# Patient Record
Sex: FEMALE | Race: WHITE | NOT HISPANIC OR LATINO | Employment: UNEMPLOYED | URBAN - METROPOLITAN AREA
[De-identification: names, ages, dates, MRNs, and addresses within clinical notes are randomized per-mention and may not be internally consistent; named-entity substitution may affect disease eponyms.]

---

## 2017-04-10 DIAGNOSIS — I10 ESSENTIAL HYPERTENSION: ICD-10-CM

## 2017-04-11 RX ORDER — AMLODIPINE BESYLATE 5 MG/1
5 TABLET ORAL DAILY
Qty: 90 TABLET | Refills: 4 | OUTPATIENT
Start: 2017-04-11

## 2017-04-12 DIAGNOSIS — I10 ESSENTIAL HYPERTENSION: ICD-10-CM

## 2017-04-12 RX ORDER — AMLODIPINE BESYLATE 5 MG/1
5 TABLET ORAL DAILY
Qty: 30 TABLET | Refills: 0 | OUTPATIENT
Start: 2017-04-12

## 2017-04-12 NOTE — TELEPHONE ENCOUNTER
Unable to contact patient, letter mailed to home address that she will need to establish care with a new provider. Her last annual was last February.

## 2017-04-12 NOTE — TELEPHONE ENCOUNTER
----- Message from Corey Spivey sent at 4/12/2017  1:59 PM CDT -----  Contact: Self  REFILL: amlodipine (NORVASC) 5 MG tablet

## 2017-04-12 NOTE — LETTER
April 12, 2017    Livier Quintana  [address]  [place] [address]             [facility]  [address]  [place] [address]  Phone: [phone]  Fax: [phone] Dear Mrs. Quintana:      As of 12/31/2016 Dr. Shane retired. We received your refill  request but you will need to choose a new provider and schedule  an office visit to establish care. Please contact the clinic for any  assistance you may need to schedule this appointment.       If you have any questions or concerns, please don't hesitate to call.    Sincerely,        Deisi Gomez LPN

## 2017-04-13 DIAGNOSIS — I10 ESSENTIAL HYPERTENSION: ICD-10-CM

## 2017-04-13 PROBLEM — F41.9 ANXIETY: Chronic | Status: ACTIVE | Noted: 2017-04-13

## 2017-04-13 RX ORDER — AMLODIPINE BESYLATE 5 MG/1
5 TABLET ORAL DAILY
Qty: 90 TABLET | Refills: 4 | Status: SHIPPED | OUTPATIENT
Start: 2017-04-13 | End: 2017-04-17 | Stop reason: ALTCHOICE

## 2017-04-13 NOTE — TELEPHONE ENCOUNTER
----- Message from Corey Spivey sent at 4/13/2017 12:40 PM CDT -----  Contact: Self  Pt wants to know if she can get a few amlodipine (NORVASC) 5 MG tablet pills until her OV on 4/17 with . Pt can be reached @ [phone].

## 2017-04-17 ENCOUNTER — OFFICE VISIT (OUTPATIENT)
Dept: FAMILY MEDICINE | Facility: CLINIC | Age: 57
End: 2017-04-17
Payer: COMMERCIAL

## 2017-04-17 VITALS
SYSTOLIC BLOOD PRESSURE: 120 MMHG | HEIGHT: 62 IN | WEIGHT: 137.25 LBS | DIASTOLIC BLOOD PRESSURE: 82 MMHG | OXYGEN SATURATION: 97 % | BODY MASS INDEX: 25.26 KG/M2 | HEART RATE: 75 BPM | TEMPERATURE: 98 F

## 2017-04-17 DIAGNOSIS — R51.9 HEADACHE, UNSPECIFIED HEADACHE TYPE: ICD-10-CM

## 2017-04-17 DIAGNOSIS — F41.9 ANXIETY: Chronic | ICD-10-CM

## 2017-04-17 DIAGNOSIS — M54.2 NECK PAIN ON LEFT SIDE: ICD-10-CM

## 2017-04-17 DIAGNOSIS — F51.01 PRIMARY INSOMNIA: ICD-10-CM

## 2017-04-17 DIAGNOSIS — I10 ESSENTIAL HYPERTENSION: Primary | Chronic | ICD-10-CM

## 2017-04-17 DIAGNOSIS — M25.512 LEFT SHOULDER PAIN, UNSPECIFIED CHRONICITY: ICD-10-CM

## 2017-04-17 DIAGNOSIS — Z11.59 NEED FOR HEPATITIS C SCREENING TEST: ICD-10-CM

## 2017-04-17 DIAGNOSIS — M79.602 ARM PAIN, DIFFUSE, LEFT: ICD-10-CM

## 2017-04-17 DIAGNOSIS — L98.9 LEG SKIN LESION, LEFT: ICD-10-CM

## 2017-04-17 DIAGNOSIS — Z23 NEED FOR DIPHTHERIA-TETANUS-PERTUSSIS (TDAP) VACCINE, ADULT/ADOLESCENT: ICD-10-CM

## 2017-04-17 DIAGNOSIS — R00.2 PALPITATIONS: ICD-10-CM

## 2017-04-17 DIAGNOSIS — Z78.0 POST-MENOPAUSAL: ICD-10-CM

## 2017-04-17 PROCEDURE — 3074F SYST BP LT 130 MM HG: CPT | Mod: S$GLB,,, | Performed by: INTERNAL MEDICINE

## 2017-04-17 PROCEDURE — 93005 ELECTROCARDIOGRAM TRACING: CPT | Mod: S$GLB,,, | Performed by: INTERNAL MEDICINE

## 2017-04-17 PROCEDURE — 99215 OFFICE O/P EST HI 40 MIN: CPT | Mod: 25,S$GLB,, | Performed by: INTERNAL MEDICINE

## 2017-04-17 PROCEDURE — 90715 TDAP VACCINE 7 YRS/> IM: CPT | Mod: S$GLB,,, | Performed by: INTERNAL MEDICINE

## 2017-04-17 PROCEDURE — 1160F RVW MEDS BY RX/DR IN RCRD: CPT | Mod: S$GLB,,, | Performed by: INTERNAL MEDICINE

## 2017-04-17 PROCEDURE — 3079F DIAST BP 80-89 MM HG: CPT | Mod: S$GLB,,, | Performed by: INTERNAL MEDICINE

## 2017-04-17 PROCEDURE — 93010 ELECTROCARDIOGRAM REPORT: CPT | Mod: S$GLB,,, | Performed by: INTERNAL MEDICINE

## 2017-04-17 PROCEDURE — 90471 IMMUNIZATION ADMIN: CPT | Mod: S$GLB,,, | Performed by: INTERNAL MEDICINE

## 2017-04-17 PROCEDURE — 99999 PR PBB SHADOW E&M-EST. PATIENT-LVL III: CPT | Mod: PBBFAC,,, | Performed by: INTERNAL MEDICINE

## 2017-04-17 RX ORDER — ALPRAZOLAM 0.25 MG/1
0.25 TABLET ORAL 2 TIMES DAILY PRN
Qty: 60 TABLET | Refills: 0 | Status: SHIPPED | OUTPATIENT
Start: 2017-04-17 | End: 2017-10-18

## 2017-04-17 RX ORDER — NAPROXEN 500 MG/1
500 TABLET ORAL 2 TIMES DAILY WITH MEALS
Qty: 30 TABLET | Refills: 3 | Status: SHIPPED | OUTPATIENT
Start: 2017-04-17 | End: 2017-10-18

## 2017-04-17 NOTE — PROGRESS NOTES
Assessment & Plan  Essential hypertension - BP actually pretty normal off meds.  For now stay off the amlodipine and monitor; restart for BP > 140/90 consistently.   Possible that BP too low and responsive tachycardia as a result.  -     Lipid panel; Future; Expected date: 4/17/17  -     Comprehensive metabolic panel; Future; Expected date: 4/17/17  -     TSH; Future; Expected date: 4/17/17    Anxiety - refilled xanax to take PRN.  -     alprazolam (XANAX) 0.25 MG tablet; Take 1 tablet (0.25 mg total) by mouth 2 (two) times daily as needed.  Dispense: 60 tablet; Refill: 0    Palpitations - EKG WNL; she'll contact me if this persists off BP meds and if so - referral to cardiology.  -     EKG 12-lead    Post-menopausal - check Vit D.  Not taking supplement.  -     Vitamin D; Future; Expected date: 4/17/17    Leg skin lesion, left - and on the chest - nonhealing on the L leg; the chest one looks benign.  Referral to derm for eval.  -     Ambulatory referral to Dermatology    Neck pain on left side  Left shoulder pain, unspecified chronicity  Headache - refill naproxen for PRN use.  -     naproxen (NAPROSYN) 500 MG tablet; Take 1 tablet (500 mg total) by mouth 2 (two) times daily with meals.  Dispense: 30 tablet; Refill: 3    Need for hepatitis C screening test  -     Hepatitis C antibody; Future; Expected date: 4/17/17    Need for diphtheria-tetanus-pertussis (Tdap) vaccine, adult/adolescent  -     Tdap Vaccine    Primary insomnia - recommended OTC melatonin to start.        Medications Discontinued During This Encounter   Medication Reason    alprazolam (XANAX) 0.25 MG tablet Reorder    naproxen (NAPROSYN) 500 MG tablet Reorder       Follow-up: No Follow-up on file.      =================================================================      Chief Complaint   Patient presents with    est pcp    Medication Refill       STAR Maier is a 57 y.o. female, last appointment with this clinic was Visit date not  found.    Patient's last menstrual period was 09/01/2012.    Mother is Ina Rosado    Former patient of Dr. Shane.  Hypertension, amlodipine x years.  By descripption, sounds like hx of diuretic and ACEI with SE of cough.  Checks BP and HR daily.   Out of amlodipine x 1 week.  However, BP today is pretty good still normally 110/70s.  Has been taking x years.  Notes however, Home HR - in the 80s.  Feels like it races.  Almost like it flutters.  Makes her feel dyspneic.  Occurs at rest like watching TV.  Daily occurrence.    She walks a lot for physical activity but not dedicated exercise. No chest pain/dyspnea/presyncope with physical activity.    Anxiety with a history of alprazolam, requesting RF; #60 lasts a year.  Takes only during extreme episodes of anxiety.  Family history breast cancer.  Followed by gynecology.  3/9/2015 colonoscopy normal repeat 10 years  Notes some issues with insomnia. Would like something mild for this.  Is concerned about taking anything strong for this ie the alprazolam.  Never OTC melatonin.  Benadryl caused opposite effect in her mother so would like to avoid that.    GERD - Takes prilosec PRN.  Takes Tums maybe once a week.  Notes some early satiety.  No pain swallowing.  No food getting stuck sensation.  Had a colonoscopy done about 2 years ago.  Weight stable x years.    Has heard about vitamin D supplement and wonders if she needs it.  Not taking HRT.  Post menopausal.  Is interested in seeing dermatology.  Has hx of mole removal on chest, arm, and has a spot on the L lateral knee, palpable.    Requesting labs. nonfasting today.    Out of amlodipine x 1 week.  .  By history sounds like hx of ACEI      Patient Care Team:  Juan Jose Guallpa MD as PCP - General (Internal Medicine)    Patient Active Problem List    Diagnosis Date Noted    Anxiety 04/13/2017    Family history of breast cancer in sister 02/23/2016    Essential hypertension 10/05/2015    Screening for colon  cancer 2015     3/9/2015 colonoscopy normal repeat 10 years      Nuclear sclerosis - Both Eyes 2012       PAST MEDICAL HISTORY:  Past Medical History:   Diagnosis Date    Amblyopia     Family history of breast cancer     SISTER WITH RECURRENCE    Fissure, anal     inner     Hypertension     Nuclear sclerosis - Both Eyes 2012    Strabismus        PAST SURGICAL HISTORY:  Past Surgical History:   Procedure Laterality Date     SECTION, CLASSIC      x3    ENDOMETRIAL ABLATION      KJB---D&C HSCOPE/HTA    STRABISMUS SURGERY      X 2     Family History   Problem Relation Age of Onset    Amblyopia Mother     Rheum arthritis Mother     Heart disease Father     Hypertension Father     Pulmonary fibrosis Father     Arrhythmia Father     Amblyopia Sister     Glaucoma Sister      narrow angle     Breast cancer Sister     Diabetes Maternal Grandmother     Ovarian cancer Neg Hx     Colon cancer Neg Hx        SOCIAL HISTORY:  Social History     Social History    Marital status:      Spouse name: N/A    Number of children: N/A    Years of education: N/A     Occupational History    Not on file.     Social History Main Topics    Smoking status: Never Smoker    Smokeless tobacco: Not on file    Alcohol use No      Comment: socially    Drug use: No    Sexual activity: Yes     Other Topics Concern    Not on file     Social History Narrative       ALLERGIES AND MEDICATIONS: updated and reviewed.  Review of patient's allergies indicates:  No Known Allergies  Current Outpatient Prescriptions   Medication Sig Dispense Refill    alprazolam (XANAX) 0.25 MG tablet Take 1 tablet (0.25 mg total) by mouth 2 (two) times daily as needed. 60 tablet 0    amlodipine (NORVASC) 5 MG tablet Take 1 tablet (5 mg total) by mouth once daily. 90 tablet 4    naproxen (NAPROSYN) 500 MG tablet Take 1 tablet (500 mg total) by mouth 2 (two) times daily with meals. 30 tablet 1     No current  "facility-administered medications for this visit.        Review of Systems   Constitutional: Negative for chills, fever, malaise/fatigue and weight loss.   HENT: Negative for congestion.    Eyes: Negative for blurred vision and pain.   Respiratory: Negative for cough, sputum production, shortness of breath and wheezing.    Cardiovascular: Positive for palpitations. Negative for chest pain, claudication, leg swelling and PND.   Gastrointestinal: Negative for abdominal pain, blood in stool, constipation, diarrhea and heartburn.   Genitourinary: Negative for dysuria, hematuria and urgency.   Musculoskeletal: Negative for joint pain.   Skin: Positive for rash.   Neurological: Negative for tingling, focal weakness, weakness and headaches.   Psychiatric/Behavioral: Negative for depression. The patient has insomnia. The patient is not nervous/anxious.        Physical Exam   Vitals:    04/17/17 1105 04/17/17 1217   BP: 126/84 120/82   BP Location: Right arm Right arm   Patient Position: Sitting Sitting   BP Method: Manual    Pulse: 75    Temp: 97.8 °F (36.6 °C)    TempSrc: Oral    SpO2: 97%    Weight: 62.2 kg (137 lb 3.8 oz)    Height: 5' 2" (1.575 m)     Body mass index is 25.1 kg/(m^2).  Weight: 62.2 kg (137 lb 3.8 oz)   Height: 5' 2" (157.5 cm)     Physical Exam   Constitutional: She is oriented to person, place, and time. She appears well-developed and well-nourished. No distress.   Eyes: EOM are normal.   Cardiovascular: Normal rate, regular rhythm and normal heart sounds.    No murmur heard.  Pulmonary/Chest: Effort normal and breath sounds normal.   Musculoskeletal: Normal range of motion.   Neurological: She is alert and oriented to person, place, and time. Coordination normal.   Skin: Skin is warm and dry.   Left lateral knee - small papule looks almost like a scab but pt notes present x > 1 year, < 1mm diameter, no induration, no surr erythema, no ulceration.  Left upper chest - well defined large papule, skin " colored, somewhat velvety, about 7 mm diameter   Psychiatric: She has a normal mood and affect. Her behavior is normal. Thought content normal.     EKG - tracing personally reviewed - NSR, normal intervals.  Nonspecific T wave abn in the frontal leads V1-V3 otherwise unremarkable.

## 2017-04-17 NOTE — MR AVS SNAPSHOT
[facility]  [address]  [place] [address]  Phone: [phone]  Fax: [phone]                  Livier Quintana   2017 11:00 AM   Office Visit    Description:  Female : 1960   Provider:  Juan Jose Guallpa MD   Department:  [facility]           Reason for Visit     est pcp     Medication Refill           Diagnoses this Visit        Comments    Essential hypertension    -  Primary     Anxiety         Headache, unspecified headache type         Palpitations         Post-menopausal         Leg skin lesion, left         Neck pain on left side         Left shoulder pain, unspecified chronicity         Arm pain, diffuse, left         Need for hepatitis C screening test         Need for diphtheria-tetanus-pertussis (Tdap) vaccine, adult/adolescent         Primary insomnia                To Do List           Goals (5 Years of Data)     None       These Medications        Disp Refills Start End    alprazolam (XANAX) 0.25 MG tablet 60 tablet 0 2017     Take 1 tablet (0.25 mg total) by mouth 2 (two) times daily as needed. - Oral    Pharmacy: [facility] - [place] [address] Ph #: [phone]       naproxen (NAPROSYN) 500 MG tablet 30 tablet 3 2017     Take 1 tablet (500 mg total) by mouth 2 (two) times daily with meals. - Oral    Pharmacy: [facility] - [place] Ph #: [phone]         [facility] On [facility]     [facility] On Call Nurse Care Line -  Assistance  Unless otherwise directed by your provider, please contact [facility], our nurse care line that is available for  assistance.     Registered nurses in the [facility] Center provide: appointment scheduling, clinical advisement, health education, and other advisory services.  Call: [phone] (toll free)               Medications           Message regarding Medications     Verify the changes and/or  "additions to your medication regime listed below are the same as discussed with your clinician today.  If any of these changes or additions are incorrect, please notify your healthcare provider.             Verify that the below list of medications is an accurate representation of the medications you are currently taking.  If none reported, the list may be blank. If incorrect, please contact your healthcare provider. Carry this list with you in case of emergency.           Current Medications     alprazolam (XANAX) 0.25 MG tablet Take 1 tablet (0.25 mg total) by mouth 2 (two) times daily as needed.    amlodipine (NORVASC) 5 MG tablet Take 1 tablet (5 mg total) by mouth once daily.    naproxen (NAPROSYN) 500 MG tablet Take 1 tablet (500 mg total) by mouth 2 (two) times daily with meals.           Clinical Reference Information           Your Vitals Were     BP Pulse Temp Height Weight Last Period    126/84 (BP Location: Right arm, Patient Position: Sitting, BP Method: Manual) 75 97.8 °F (36.6 °C) (Oral) 5' 2" (1.575 m) 62.2 kg (137 lb 3.8 oz) 09/01/2012    SpO2 BMI             97% 25.1 kg/m2         Blood Pressure          Most Recent Value    BP  126/84      Allergies as of 4/17/2017     No Known Allergies      Immunizations Administered on Date of Encounter - 4/17/2017     Name Date Dose VIS Date Route    TDAP  Incomplete 0.5 mL 2/24/2015 Intramuscular      Orders Placed During Today's Visit      Normal Orders This Visit    Ambulatory referral to Dermatology     EKG 12-lead     Tdap Vaccine     Future Labs/Procedures Expected by Expires    Comprehensive metabolic panel  4/17/2017 4/17/2018    Hepatitis C antibody  4/17/2017 6/16/2018    Lipid panel  4/17/2017 4/17/2018    TSH  4/17/2017 4/17/2018    Vitamin D  4/17/2017 4/17/2018      [facility] Sign-Up     Activating your [facility] account is as easy as 1-2-3!     1) Visit [email or web address], select Sign Up Now, enter this activation code and your date of birth, then " select Next.  [email or web address]  Expires: 6/1/2017 11:08 AM      2) Create a username and password to use when you visit [facility] in the future and select a security question in case you lose your password and select Next.    3) Enter your e-mail address and click Sign Up!    Additional Information  If you have questions, please e-mail [email or web address] or call [phone] to talk to our [facility] staff. Remember, [facility] is NOT to be used for urgent needs. For medical emergencies, dial 911.         Instructions    FOR NOW STAY OFF THE AMLODIPINE.  CHECK BP - IF IT GOES ABOVE 140/90 AND STAYS HIGH - CALL - RESTART AMLODIPINE.       Language Assistance Services     ATTENTION: Language assistance services are available, free of charge. Please call [phone].      ATENCIÓN: Si habla español, tiene a hernández disposición servicios gratuitos de asistencia lingüística. Llame al [phone].     JASMIN Ý: N?u b?n nói Ti?ng Vi?t, có các d?ch v? h? tr? ngôn ng? mi?n phí dành cho b?n. G?i s? [phone].         [facility] [place] complies with applicable Federal civil rights laws and does not discriminate on the basis of race, color, national origin, age, disability, or sex.

## 2017-04-18 ENCOUNTER — LAB VISIT (OUTPATIENT)
Dept: LAB | Facility: HOSPITAL | Age: 57
End: 2017-04-18
Attending: INTERNAL MEDICINE
Payer: COMMERCIAL

## 2017-04-18 DIAGNOSIS — Z78.0 POST-MENOPAUSAL: ICD-10-CM

## 2017-04-18 DIAGNOSIS — I10 ESSENTIAL HYPERTENSION: Chronic | ICD-10-CM

## 2017-04-18 DIAGNOSIS — Z11.59 NEED FOR HEPATITIS C SCREENING TEST: ICD-10-CM

## 2017-04-18 LAB
25(OH)D3+25(OH)D2 SERPL-MCNC: 26 NG/ML
ALBUMIN SERPL BCP-MCNC: 3.9 G/DL
ALP SERPL-CCNC: 112 U/L
ALT SERPL W/O P-5'-P-CCNC: 11 U/L
ANION GAP SERPL CALC-SCNC: 7 MMOL/L
AST SERPL-CCNC: 17 U/L
BILIRUB SERPL-MCNC: 0.5 MG/DL
BUN SERPL-MCNC: 18 MG/DL
CALCIUM SERPL-MCNC: 9 MG/DL
CHLORIDE SERPL-SCNC: 109 MMOL/L
CHOLEST/HDLC SERPL: 3.9 {RATIO}
CO2 SERPL-SCNC: 25 MMOL/L
CREAT SERPL-MCNC: 0.8 MG/DL
EST. GFR  (AFRICAN AMERICAN): >60 ML/MIN/1.73 M^2
EST. GFR  (NON AFRICAN AMERICAN): >60 ML/MIN/1.73 M^2
GLUCOSE SERPL-MCNC: 93 MG/DL
HCV AB SERPL QL IA: NEGATIVE
HDL/CHOLESTEROL RATIO: 25.7 %
HDLC SERPL-MCNC: 218 MG/DL
HDLC SERPL-MCNC: 56 MG/DL
LDLC SERPL CALC-MCNC: 132.4 MG/DL
NONHDLC SERPL-MCNC: 162 MG/DL
POTASSIUM SERPL-SCNC: 4 MMOL/L
PROT SERPL-MCNC: 7.3 G/DL
SODIUM SERPL-SCNC: 141 MMOL/L
TRIGL SERPL-MCNC: 148 MG/DL
TSH SERPL DL<=0.005 MIU/L-ACNC: 1.56 UIU/ML

## 2017-04-18 PROCEDURE — 84443 ASSAY THYROID STIM HORMONE: CPT

## 2017-04-18 PROCEDURE — 80053 COMPREHEN METABOLIC PANEL: CPT

## 2017-04-18 PROCEDURE — 80061 LIPID PANEL: CPT

## 2017-04-18 PROCEDURE — 86803 HEPATITIS C AB TEST: CPT

## 2017-04-18 PROCEDURE — 36415 COLL VENOUS BLD VENIPUNCTURE: CPT | Mod: PO

## 2017-04-18 PROCEDURE — 82306 VITAMIN D 25 HYDROXY: CPT

## 2017-04-20 ENCOUNTER — TELEPHONE (OUTPATIENT)
Dept: FAMILY MEDICINE | Facility: CLINIC | Age: 57
End: 2017-04-20

## 2017-04-20 NOTE — PROGRESS NOTES
Vit D low. Post menopausal not on supplement.  HCV screening negative.  Lipid OK, 10 year risk < 7.5%.  Not calling her hypertensive, stopped BP meds.  CMP WNL  Results to pt.  Start Ca/vit D supplement 1200/800 mg daily.

## 2017-04-20 NOTE — TELEPHONE ENCOUNTER
Patient has an appointment 6/22/17 10am with Dr. Clemens at the [facility], patient was notified.

## 2017-06-22 ENCOUNTER — INITIAL CONSULT (OUTPATIENT)
Dept: DERMATOLOGY | Facility: CLINIC | Age: 57
End: 2017-06-22
Payer: COMMERCIAL

## 2017-06-22 DIAGNOSIS — L73.8 SEBACEOUS HYPERPLASIA OF FACE: ICD-10-CM

## 2017-06-22 DIAGNOSIS — L81.4 SOLAR LENTIGO: ICD-10-CM

## 2017-06-22 DIAGNOSIS — D22.9 MULTIPLE BENIGN NEVI: Primary | ICD-10-CM

## 2017-06-22 DIAGNOSIS — L72.0 MILIUM CYST: ICD-10-CM

## 2017-06-22 DIAGNOSIS — L71.8 ACNE ROSACEA, ERYTHEMATOUS TELANGIECTATIC TYPE: ICD-10-CM

## 2017-06-22 DIAGNOSIS — L81.8 IDIOPATHIC GUTTATE HYPOMELANOSIS: ICD-10-CM

## 2017-06-22 DIAGNOSIS — D18.01 ANGIOMA OF SKIN: ICD-10-CM

## 2017-06-22 DIAGNOSIS — L82.1 SK (SEBORRHEIC KERATOSIS): ICD-10-CM

## 2017-06-22 PROCEDURE — 99999 PR PBB SHADOW E&M-EST. PATIENT-LVL II: CPT | Mod: PBBFAC,,, | Performed by: PATHOLOGY

## 2017-06-22 PROCEDURE — 99203 OFFICE O/P NEW LOW 30 MIN: CPT | Mod: S$GLB,,, | Performed by: PATHOLOGY

## 2017-06-22 RX ORDER — AMLODIPINE BESYLATE 2.5 MG/1
2.5 TABLET ORAL DAILY
COMMUNITY
End: 2018-07-20

## 2017-06-22 NOTE — PROGRESS NOTES
Subjective:       Patient ID:  Livier Quintana is a 57 y.o. female who presents for   Chief Complaint   Patient presents with    Skin Check     TBSE     HPI  Pt with no personal h/o skin cancer or dysplastic nevi.  Does get significant sun exposure - recreational fishing.  Concerned about some new raised brown spots to legs and some raised bumps on face - all asymptomatic and not treated in the past.  All present for several months.    Review of Systems   Constitutional: Negative for fever, chills, weight loss, weight gain, fatigue, night sweats and malaise.   Skin: Positive for daily sunscreen use and activity-related sunscreen use. Negative for itching, rash and recent sunburn.   Hematologic/Lymphatic: Bruises/bleeds easily.        Objective:    Physical Exam   Constitutional: She appears well-developed and well-nourished. No distress.   Neurological: She is alert and oriented to person, place, and time. She is not disoriented.   Psychiatric: She has a normal mood and affect.   Skin:   Areas Examined (abnormalities noted in diagram):   Scalp / Hair Palpated and Inspected  Head / Face Inspection Performed  Neck Inspection Performed  Chest / Axilla Inspection Performed  Abdomen Inspection Performed  Genitals / Buttocks / Groin Inspection Performed  Back Inspection Performed  RUE Inspected  LUE Inspection Performed  RLE Inspected  LLE Inspection Performed  Nails and Digits Inspection Performed                   Diagram Legend     Erythematous scaling macule/papule c/w actinic keratosis       Vascular papule c/w angioma      Pigmented verrucoid papule/plaque c/w seborrheic keratosis      Yellow umbilicated papule c/w sebaceous hyperplasia      Irregularly shaped tan macule c/w lentigo     1-2 mm smooth white papules consistent with Milia      Movable subcutaneous cyst with punctum c/w epidermal inclusion cyst      Subcutaneous movable cyst c/w pilar cyst      Firm pink to brown papule c/w dermatofibroma       Pedunculated fleshy papule(s) c/w skin tag(s)      Evenly pigmented macule c/w junctional nevus     Mildly variegated pigmented, slightly irregular-bordered macule c/w mildly atypical nevus      Flesh colored to evenly pigmented papule c/w intradermal nevus       Pink pearly papule/plaque c/w basal cell carcinoma      Erythematous hyperkeratotic cursted plaque c/w SCC      Surgical scar with no sign of skin cancer recurrence      Open and closed comedones      Inflammatory papules and pustules      Verrucoid papule consistent consistent with wart     Erythematous eczematous patches and plaques     Dystrophic onycholytic nail with subungual debris c/w onychomycosis     Umbilicated papule    Erythematous-base heme-crusted tan verrucoid plaque consistent with inflamed seborrheic keratosis     Erythematous Silvery Scaling Plaque c/w Psoriasis     See annotation      Assessment / Plan:        Nevi - Patient with several mildly atypical nevi. Instructed patient to observe lesion(s) for changes and follow up in clinic if changes are noted. Discussed ABCDE's of moles and brochure provided.    Milium - Reassurance given to patient. No treatment is necessary.   Treatment of benign, asymptomatic lesions may be considered cosmetic.    Solar lentigines - Reassurance given to patient. No treatment is necessary.   Treatment of benign, asymptomatic lesions may be considered cosmetic.  Discussed sun protection and importance of broad spectrum UVA/UVB sunblock    Angioma - This is a benign vascular lesion. Reassurance given. No treatment required.     SK - These are benign inherited growths without a malignant potential. Reassurance given to patient. No treatment is necessary.     Sebaceous hyperplasia - Reassurance given to patient. No treatment is necessary.   Treatment of benign, asymptomatic lesions may be considered cosmetic.    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 No treatment is necessary.    Treatment of benign, asymptomatic lesions may be considered cosmetic.    Rosacea - erythematotelangiectatic type - pt elects not to treat at this time             Return in about 1 year (around 6/22/2018), or if symptoms worsen or fail to improve.

## 2017-06-22 NOTE — LETTER
June 22, 2017      Juan Jose Guallpa MD  [address]  [place] [address]           [facility]  [address]  [place] [address]  Phone: [phone]  Fax: [phone]          Patient: Livier Quintana   MR Number: [id number]   YOB: 1960   Date of Visit: 6/22/2017       Dear Dr. Juan Jose Guallpa:    Thank you for referring Livier Quintana to me for evaluation. Attached you will find relevant portions of my assessment and plan of care.    If you have questions, please do not hesitate to call me. I look forward to following Livier Quintana along with you.    Sincerely,    Charisma Clemens MD    Enclosure  CC:  No Recipients    If you would like to receive this communication electronically, please contact [email or web address] or [phone] to request more information on [employer] Link access.    For providers and/or their staff who would like to refer a patient to [facility], please contact us through our one-stop-shop provider referral line, [facility], at [phone].    If you feel you have received this communication in error or would no longer like to receive these types of communications, please e-mail [email or web address]

## 2017-06-22 NOTE — PATIENT INSTRUCTIONS
Summer Sun Protection      The [facility] would like to remind you of the importance of sun protection all year round and particularly during the summer when the suns rays are the strongest. It has been proven that both acute and chronic sun exposure damages our cells and leads to skin cancer. Beyond skin cancer, the sun causes 90% of the symptoms of pre-mature skin aging, including wrinkles, lentigines (brown spots), and thin, easily bruised skin. Proper sun protection can help prevent these unwanted conditions.    Many patients report that the dont go in the sun. It has been shown that the average person receives 18 hours of incidental sun exposure per week during activities such as walking through parking lots, driving, or sitting next to windows. This accumulates to several bad sunburns per year!    In choosing sunscreen, you want one that protects against both UVA and UVB rays. It is recommended that you use one of SPF 30 or higher. It is important to apply the sunscreen about 20 minutes prior to sun exposure. Most sunscreens are chemical sunscreens and a reaction must take place in the skin so that they are effective. If they are applied and then you are immediately exposed to the sun or start sweating, this reaction has not had time to take place and you are therefore unprotected. Sunscreen needs to be reapplied every 2 hours if you are participating in water sports or sweating. We recommend Elta MD or Neutrogena Ultra Sheer Dry Touch SPF 55 for daily use; however there are many options and it is most important for you to find one that you will use on a consistent basis.    If you have sensitive skin, you may do best with a sunscreen that contains only physical blockers such as titanium dioxide or zinc oxide. These are typically thicker and harder to apply, however they afford very good protection. Neutrogena Sensitive Skin, Blue Lizard Sensitive Skin (pink top) or Neutrogena Pure  and Free are popular ones.     Aside from sunscreen, clothes with UV protection, wide brimmed hats, and sunglasses are other means of sun protection that we recommend.                        [facility] - DERMATOLOG[facility]  [address]  [place] [address]  Dept: [phone]  Dept Fax: [phone]                                                                               SEBORRHEIC KERATOSES        What causes seborrheic keratoses?    Seborrheic keratoses are harmless, common skin growths that first appear during adult life.  As time goes by, more growths appear.  Some persons have a very large number of them.  Seborrheic keratoses appear on both covered and uncovered parts of the body; they are not caused by sunlight.  The tendency to develop seborrheic keratoses is inherited.    Seborrheic keratoses are harmless and never become malignant.  They begin as slightly raised, light brown spots.  Gradually they thicken and take on a rough wartlike surface.  They slowly darken and may turn black.  These color changes are harmless.  Seborrheic keratoses are superficial and look as if they were stuck on the skin.  Persons who have had several seborrheic keratoses can usually recognize this type of benign growth.  However, if you are concerned or unsure about any growth, consult me.    Treatment    Seborrheic keratoses can easily be removed in the office.  The only reason for removing a seborrheic keratosis is your wish to get rid of it.

## 2017-06-23 ENCOUNTER — OFFICE VISIT (OUTPATIENT)
Dept: OPHTHALMOLOGY | Facility: CLINIC | Age: 57
End: 2017-06-23
Payer: COMMERCIAL

## 2017-06-23 DIAGNOSIS — I10 ESSENTIAL HYPERTENSION: ICD-10-CM

## 2017-06-23 DIAGNOSIS — H25.13 NUCLEAR SCLEROSIS, BILATERAL: Primary | ICD-10-CM

## 2017-06-23 DIAGNOSIS — H04.123 DRY EYES, BILATERAL: ICD-10-CM

## 2017-06-23 DIAGNOSIS — H52.7 REFRACTIVE ERROR: ICD-10-CM

## 2017-06-23 PROCEDURE — 92014 COMPRE OPH EXAM EST PT 1/>: CPT | Mod: S$GLB,,, | Performed by: OPHTHALMOLOGY

## 2017-06-23 PROCEDURE — 99999 PR PBB SHADOW E&M-EST. PATIENT-LVL II: CPT | Mod: PBBFAC,,, | Performed by: OPHTHALMOLOGY

## 2017-06-23 NOTE — PROGRESS NOTES
Subjective:       Patient ID: Livier Quintana is a 57 y.o. female.    Chief Complaint: 1 yr Cataract ck    HPI  Review of Systems    Objective:      Physical Exam    Assessment:       1. Nuclear sclerosis, bilateral    2. Dry eyes, bilateral    3. Essential hypertension    4. Refractive error        Plan:       Cataracts- Not visually significant.  SKYE-Doing well with AT's.  HTN-No retinopathy OU.  RE-Pt wants CRx.      AT's.  Control HTN.  Give CRx.  RTC 1 yr.

## 2017-10-18 ENCOUNTER — OFFICE VISIT (OUTPATIENT)
Dept: OBSTETRICS AND GYNECOLOGY | Facility: CLINIC | Age: 57
End: 2017-10-18
Payer: COMMERCIAL

## 2017-10-18 VITALS
SYSTOLIC BLOOD PRESSURE: 110 MMHG | HEIGHT: 62 IN | BODY MASS INDEX: 25.48 KG/M2 | DIASTOLIC BLOOD PRESSURE: 82 MMHG | WEIGHT: 138.44 LBS

## 2017-10-18 DIAGNOSIS — Z12.31 VISIT FOR SCREENING MAMMOGRAM: ICD-10-CM

## 2017-10-18 DIAGNOSIS — Z80.3 FAMILY HISTORY OF BREAST CANCER IN SISTER: ICD-10-CM

## 2017-10-18 DIAGNOSIS — Z01.419 ENCOUNTER FOR GYNECOLOGICAL EXAMINATION WITHOUT ABNORMAL FINDING: Primary | ICD-10-CM

## 2017-10-18 PROCEDURE — 99396 PREV VISIT EST AGE 40-64: CPT | Mod: S$GLB,,, | Performed by: OBSTETRICS & GYNECOLOGY

## 2017-10-18 PROCEDURE — 99999 PR PBB SHADOW E&M-EST. PATIENT-LVL II: CPT | Mod: PBBFAC,,, | Performed by: OBSTETRICS & GYNECOLOGY

## 2017-10-18 RX ORDER — AMLODIPINE BESYLATE 5 MG/1
TABLET ORAL
COMMUNITY
Start: 2017-09-11 | End: 2017-10-18 | Stop reason: ALTCHOICE

## 2017-10-18 NOTE — PROGRESS NOTES
PT HERE FOR ANNUAL.  NO PROBLEMS.    ROS:  GENERAL: No fever, chills, fatigability or weight loss.  VULVAR: No pain, no lesions and no itching.  VAGINAL: No relaxation, no itching, no discharge, no abnormal bleeding and no lesions.  ABDOMEN: No abdominal pain. Denies nausea. Denies vomiting. No diarrhea. No constipation  BREAST: Denies pain. No lumps. No discharge.  URINARY: No incontinence, no nocturia, no frequency and no dysuria.  CARDIOVASCULAR: No chest pain. No shortness of breath. No leg cramps.  NEUROLOGICAL: No headaches. No vision changes.  The remainder of the review of systems was negative.    PE:  General Appearance: normal weight And Well developed. Well nourished. In no acute distress.  Vulva: Lesions: No.  Urethral Meatus: Normal size. Normal location. No lesions. No prolapse.  Urethra: No masses. No tenderness. No prolapse. No scarring.  Bladder: No masses. No tenderness.  Vagina: Mucosa NI:yes discharge no, atrophy yes, cystocele no or rectocele no.  Cervix: Lesion: no  Stenotic: no Cervical motion tenderness: no  Uterus: Uterus size: 5 weeks. Support good. Uterus size: Normal  Adnexa: Masses: No Tenderness: No CDS Nodularity: No  Abdomen: normal weight No masses. No tenderness.  Breasts: No bilateral masses. No bilateral discharge. No bilateral tenderness. No bilateral fibrocystic changes.  Neck: No thyroid enlargement. No thyroid masses.  Skin: Rashes: No      PROCEDURES:    PLAN:     DIAGNOSIS:  1. Encounter for gynecological examination without abnormal finding    2. Family history of breast cancer in sister    3. Visit for screening mammogram        MEDICATIONS & ORDERS:  Orders Placed This Encounter    Mammo Digital Screening Bilat with Tomosynthesis CAD       Patient was counseled today on the new ACS guidelines for cervical cytology screening as well as the current recommendations for breast cancer screening. She was counseled to follow up with her PCP for other routine health maintenance.  Counseling session lasted approximately 10 minutes, and all her questions were answered.         FOLLOW-UP: With me in 12 month

## 2017-10-27 ENCOUNTER — HOSPITAL ENCOUNTER (OUTPATIENT)
Dept: RADIOLOGY | Facility: HOSPITAL | Age: 57
Discharge: HOME | End: 2017-10-27
Attending: OBSTETRICS & GYNECOLOGY
Payer: COMMERCIAL

## 2017-10-27 VITALS — BODY MASS INDEX: 25.4 KG/M2 | WEIGHT: 138 LBS | HEIGHT: 62 IN

## 2017-10-27 DIAGNOSIS — Z01.419 ENCOUNTER FOR GYNECOLOGICAL EXAMINATION WITHOUT ABNORMAL FINDING: ICD-10-CM

## 2017-10-27 DIAGNOSIS — Z01.419 ENCOUNTER FOR GYNECOLOGICAL EXAMINATION (GENERAL) (ROUTINE) WITHOUT ABNORMAL FINDINGS: ICD-10-CM

## 2017-10-27 DIAGNOSIS — Z80.3 FAMILY HISTORY OF BREAST CANCER IN SISTER: ICD-10-CM

## 2017-10-27 PROCEDURE — 77067 SCR MAMMO BI INCL CAD: CPT | Mod: 26,,, | Performed by: RADIOLOGY

## 2017-10-27 PROCEDURE — 77067 SCR MAMMO BI INCL CAD: CPT | Mod: TC

## 2017-10-27 PROCEDURE — 77063 BREAST TOMOSYNTHESIS BI: CPT | Mod: 26,,, | Performed by: RADIOLOGY

## 2018-07-19 NOTE — PROGRESS NOTES
This note was created by combination of typed  and Dragon dictation.  Transcription errors may be present.  If there are any questions, please contact me.    Assessment / Plan:   Normal physical exam  -     CBC auto differential; Future; Expected date: 07/20/2018  -     Comprehensive metabolic panel; Future; Expected date: 07/20/2018  -     Lipid panel; Future; Expected date: 07/20/2018  -     TSH; Future; Expected date: 07/20/2018  -     Hemoglobin A1c; Future; Expected date: 07/20/2018    Dyspnea, unspecified type  -several family members with pulmonary fibrosis.  She is a nonsmoker.  Does note some mild episodic dyspnea.  No accompanying chest pain.  Check chest x-ray and PFTs.  If anything abnormal on chest x-ray, next step would be chest CT.  -     X-Ray Chest PA And Lateral; Future; Expected date: 07/20/2018  -     Complete PFT with bronchodilator; Future    Essential hypertension  -she did not do well with stopping amlodipine, had headaches.  On low-dose amlodipine 2.5 with blood pressure control today.  Prescription sent to pharmacy  -     amLODIPine (NORVASC) 2.5 MG tablet; Take 1 tablet (2.5 mg total) by mouth once daily.  Dispense: 90 tablet; Refill: 3    Asymptomatic menopausal state  Early menopause  -check baseline bone density scan  -     DXA Bone Density Spine And Hip; Future; Expected date: 07/20/2018    Vitamin D deficiency  -taking multivitamin.  Check vitamin-D level.  -     Vitamin D; Future; Expected date: 07/20/2018    Anxiety, xanax for sleep mainly. melatonin with SE  -I have encouraged her to use the Xanax as sparingly as possible.  Has not tried hydroxyzine for anxiety/insomnia and I have encouraged her to try this instead.  Prescription for hydroxyzine 10 mg sent to pharmacy.  -     ALPRAZolam (XANAX) 0.25 MG tablet; Take 1 tablet (0.25 mg total) by mouth nightly as needed for Anxiety.  Dispense: 60 tablet; Refill: 0  -     hydrOXYzine HCl (ATARAX) 10 MG Tab; Take 1 tablet (10  mg total) by mouth nightly as needed.  Dispense: 30 tablet; Refill: 11    Lateral epicondylitis, unspecified laterality  Arthralgia, unspecified joint  -no evidence on physical exam of active synovitis.  Do not think this is rheumatoid arthritis.  Naproxen to take sparingly.  Encourage Tylenol as an alternative.  Home physical therapy handout for the elbow given  -     naproxen (NAPROSYN) 500 MG tablet; Take 1 tablet (500 mg total) by mouth 2 (two) times daily.  Dispense: 30 tablet; Refill: 5    Palpitations  -she had brought this up at her last visit and it is persisting.  Episodes maybe once a week.  After discussion she is reluctantly agreeable to see Cardiology about this.  -     Ambulatory referral to Cardiology      Medications Discontinued During This Encounter   Medication Reason    amlodipine (NORVASC) 2.5 MG tablet      Modified Medications    No medications on file     New Prescriptions    ALPRAZOLAM (XANAX) 0.25 MG TABLET    Take 1 tablet (0.25 mg total) by mouth nightly as needed for Anxiety.    AMLODIPINE (NORVASC) 2.5 MG TABLET    Take 1 tablet (2.5 mg total) by mouth once daily.    HYDROXYZINE HCL (ATARAX) 10 MG TAB    Take 1 tablet (10 mg total) by mouth nightly as needed.    NAPROXEN (NAPROSYN) 500 MG TABLET    Take 1 tablet (500 mg total) by mouth 2 (two) times daily.         Follow Up: No Follow-up on file.  Physical exam 1 year.      Subjective:     Chief Complaint   Patient presents with    Medication Refill    Hypertension       STAR Maier is a 58 y.o. female, last appointment with this clinic was Visit date not found.    Patient's last menstrual period was 09/01/2012.    Hypertension, amlodipine x years.  By description, sounds like hx of diuretic and ACEI with SE of cough.   Anxiety with a history of alprazolam, requesting RF; #60 lasts a year.   Family history breast cancer.  Followed by gynecology.  3/9/2015 colonoscopy normal repeat 10 years  Vit D low.    Last visit HTN trial off  "amlodipine, can restart if BP goes up  Xanax PRN  Palpitations, if persisting - referral to cardiology. Hypotension and reactive tachy? Should have stopped amlodipine.  Referred to derm for nonhealing lesion on the left leg. Benign findings.    She tried stopping the amlodipine but started getting headaches.  So restarted amlodipine 1/2 tablet = 2.5 mg.  Outside readings - systolic 110s.   But she notes that her HR is always up.    Father  in  of pulmonary fibrosis.  He's the 3rd person in the family to die of this. Some mild dyspnea with stairs she attributes to deconditioning.  Most physically demanding activity is housework.   Notes joint pain.  fam hx of arthritis. Knee pain, left elbow.  Mainly elbow, knees, left hip/lower back area.  Fingers are OK. Sometimes it's painless and sometimes wakes her up - so wax and wanes.     Taking women's MVI.  Not dedicated vit D.     Xanax use. Last RF 2017.  #60 at that time.  She tried melatonin with SE of "crazy dreams".  Under a lot of stress. Used to see therapist but > 1 year. Father's death, sister with breast CA.  with MDD.  She has previously taken hydroxyzine for itching but not for anxiety/sleep.  She is agreeable to try it.    Naproxen 500 mg needs RF.  Takes PRN.  Maybe once a week.  For sort of joint pains.    Still with episodes of palpitations.  Occurs at rest.  Maybe once a week.  Sensation of beating fast. And causes anxiety.  EKG last year was normal.    Patient Care Team:  Juan Jose Guallpa MD as PCP - General (Internal Medicine)    Patient Active Problem List    Diagnosis Date Noted    Vitamin D deficiency 2018    Anxiety, xanax for sleep mainly. melatonin with SE 2017    Family history of breast cancer in sister 2016    Essential hypertension 10/05/2015    Dry eyes, bilateral 10/05/2015    Refractive error 2014    Nuclear sclerosis - Both Eyes 2012       PAST MEDICAL HISTORY:  Past Medical History: "   Diagnosis Date    Amblyopia     Family history of breast cancer     SISTER WITH RECURRENCE    Fissure, anal     inner     Hypertension     Nuclear sclerosis - Both Eyes 2012    Strabismus        PAST SURGICAL HISTORY:  Past Surgical History:   Procedure Laterality Date     SECTION, CLASSIC      x3    ENDOMETRIAL ABLATION      [facility]---D&C HSCOPE/HTA    STRABISMUS SURGERY      X 2     Family History   Problem Relation Age of Onset    Amblyopia Mother     Rheum arthritis Mother     Heart disease Father     Hypertension Father     Pulmonary fibrosis Father     Arrhythmia Father     Amblyopia Sister     Glaucoma Sister         narrow angle     Breast cancer Sister 32    Diabetes Maternal Grandmother     Ovarian cancer Neg Hx     Colon cancer Neg Hx     Melanoma Neg Hx     Psoriasis Neg Hx     Lupus Neg Hx        SOCIAL HISTORY:  Social History     Social History    Marital status:      Spouse name: N/A    Number of children: N/A    Years of education: N/A     Occupational History    Not on file.     Social History Main Topics    Smoking status: Never Smoker    Smokeless tobacco: Never Used    Alcohol use No      Comment: socially    Drug use: No    Sexual activity: Not Currently     Other Topics Concern    Not on file     Social History Narrative    No narrative on file       ALLERGIES AND MEDICATIONS: updated and reviewed.  Review of patient's allergies indicates:  No Known Allergies  Current Outpatient Prescriptions   Medication Sig Dispense Refill    amlodipine (NORVASC) 2.5 MG tablet Take 2.5 mg by mouth once daily.       No current facility-administered medications for this visit.        Review of Systems   Constitutional: Negative for fever, malaise/fatigue and weight loss.   HENT: Negative for congestion.    Eyes: Negative for blurred vision and pain.   Respiratory: Positive for shortness of breath. Negative for wheezing.    Cardiovascular: Negative for  "chest pain, palpitations and leg swelling.   Gastrointestinal: Negative for abdominal pain, blood in stool, constipation, diarrhea and heartburn.   Genitourinary: Negative for dysuria, hematuria and urgency.   Musculoskeletal: Positive for joint pain.   Neurological: Negative for tingling, focal weakness, weakness and headaches.   Psychiatric/Behavioral: Negative for depression. The patient has insomnia. The patient is not nervous/anxious.        Objective:   Physical Exam   Vitals:    07/20/18 0848   BP: 102/70   Pulse: 72   Temp: 98.3 °F (36.8 °C)   SpO2: 98%   Weight: 62.9 kg (138 lb 10.7 oz)   Height: 5' 2" (1.575 m)    Body mass index is 25.36 kg/m².  Weight: 62.9 kg (138 lb 10.7 oz)   Height: 5' 2" (157.5 cm)     Physical Exam   Constitutional: She is oriented to person, place, and time. She appears well-developed and well-nourished.   HENT:   Right Ear: Tympanic membrane, external ear and ear canal normal.   Left Ear: Tympanic membrane, external ear and ear canal normal.   Mouth/Throat: Oropharynx is clear and moist.   Eyes: EOM are normal. Pupils are equal, round, and reactive to light. No scleral icterus.   Neck: Neck supple. No thyromegaly present.   Cardiovascular: Normal rate, regular rhythm and normal heart sounds.    No murmur heard.  Pulmonary/Chest: Effort normal and breath sounds normal. She has no wheezes.   Abdominal: Soft. She exhibits no mass. There is no splenomegaly or hepatomegaly. There is no tenderness.   Musculoskeletal: Normal range of motion. She exhibits no edema or deformity.   Left elbow lateral epicondyle tender, elbow joint without effusion   Lymphadenopathy:     She has no cervical adenopathy.   Neurological: She is alert and oriented to person, place, and time. She has normal reflexes.   Skin: Skin is warm and dry. No rash noted.   On exposed skin   Psychiatric: She has a normal mood and affect. Her behavior is normal. Judgment and thought content normal.     "

## 2018-07-20 ENCOUNTER — HOSPITAL ENCOUNTER (OUTPATIENT)
Dept: RADIOLOGY | Facility: HOSPITAL | Age: 58
Discharge: HOME | End: 2018-07-20
Attending: INTERNAL MEDICINE
Payer: COMMERCIAL

## 2018-07-20 ENCOUNTER — OFFICE VISIT (OUTPATIENT)
Dept: FAMILY MEDICINE | Facility: CLINIC | Age: 58
End: 2018-07-20
Payer: COMMERCIAL

## 2018-07-20 VITALS
TEMPERATURE: 98 F | HEART RATE: 72 BPM | BODY MASS INDEX: 25.52 KG/M2 | HEIGHT: 62 IN | OXYGEN SATURATION: 98 % | DIASTOLIC BLOOD PRESSURE: 70 MMHG | WEIGHT: 138.69 LBS | SYSTOLIC BLOOD PRESSURE: 102 MMHG

## 2018-07-20 DIAGNOSIS — R06.00 DYSPNEA, UNSPECIFIED TYPE: ICD-10-CM

## 2018-07-20 DIAGNOSIS — Z78.0 ASYMPTOMATIC MENOPAUSAL STATE: ICD-10-CM

## 2018-07-20 DIAGNOSIS — M77.10 LATERAL EPICONDYLITIS, UNSPECIFIED LATERALITY: ICD-10-CM

## 2018-07-20 DIAGNOSIS — I10 ESSENTIAL HYPERTENSION: Chronic | ICD-10-CM

## 2018-07-20 DIAGNOSIS — M25.50 ARTHRALGIA, UNSPECIFIED JOINT: ICD-10-CM

## 2018-07-20 DIAGNOSIS — E28.319 EARLY MENOPAUSE: ICD-10-CM

## 2018-07-20 DIAGNOSIS — E55.9 VITAMIN D DEFICIENCY: ICD-10-CM

## 2018-07-20 DIAGNOSIS — F41.9 ANXIETY: Chronic | ICD-10-CM

## 2018-07-20 DIAGNOSIS — R00.2 PALPITATIONS: ICD-10-CM

## 2018-07-20 DIAGNOSIS — Z00.00 NORMAL PHYSICAL EXAM: Primary | ICD-10-CM

## 2018-07-20 PROCEDURE — 99999 PR PBB SHADOW E&M-EST. PATIENT-LVL IV: CPT | Mod: PBBFAC,,, | Performed by: INTERNAL MEDICINE

## 2018-07-20 PROCEDURE — 3078F DIAST BP <80 MM HG: CPT | Mod: CPTII,S$GLB,, | Performed by: INTERNAL MEDICINE

## 2018-07-20 PROCEDURE — 71046 X-RAY EXAM CHEST 2 VIEWS: CPT | Mod: 26,,, | Performed by: RADIOLOGY

## 2018-07-20 PROCEDURE — 3074F SYST BP LT 130 MM HG: CPT | Mod: CPTII,S$GLB,, | Performed by: INTERNAL MEDICINE

## 2018-07-20 PROCEDURE — 71046 X-RAY EXAM CHEST 2 VIEWS: CPT | Mod: TC,FY,PO

## 2018-07-20 PROCEDURE — 99396 PREV VISIT EST AGE 40-64: CPT | Mod: S$GLB,,, | Performed by: INTERNAL MEDICINE

## 2018-07-20 RX ORDER — NAPROXEN 500 MG/1
500 TABLET ORAL 2 TIMES DAILY
Qty: 30 TABLET | Refills: 5 | Status: SHIPPED | OUTPATIENT
Start: 2018-07-20 | End: 2019-09-10 | Stop reason: SDUPTHER

## 2018-07-20 RX ORDER — AMLODIPINE BESYLATE 2.5 MG/1
2.5 TABLET ORAL DAILY
Qty: 90 TABLET | Refills: 3 | Status: SHIPPED | OUTPATIENT
Start: 2018-07-20 | End: 2019-09-10 | Stop reason: SDUPTHER

## 2018-07-20 RX ORDER — HYDROXYZINE HYDROCHLORIDE 10 MG/1
10 TABLET, FILM COATED ORAL NIGHTLY PRN
Qty: 30 TABLET | Refills: 11 | Status: SHIPPED | OUTPATIENT
Start: 2018-07-20 | End: 2019-09-10 | Stop reason: SDUPTHER

## 2018-07-20 RX ORDER — ALPRAZOLAM 0.25 MG/1
0.25 TABLET ORAL NIGHTLY PRN
Qty: 60 TABLET | Refills: 0 | Status: SHIPPED | OUTPATIENT
Start: 2018-07-20 | End: 2019-09-10 | Stop reason: SDUPTHER

## 2018-07-20 NOTE — PATIENT INSTRUCTIONS
CALCIUM - YOU SHOULD BE GETTING 1200 MG DAILY.  A SERVING OF DAIRY HAS ABOUT 300-400 MG.  WHATEVER YOU DON'T GET BY DIET, YOU CAN TAKE A CALCIUM SUPPLEMENT.

## 2018-07-26 ENCOUNTER — HOSPITAL ENCOUNTER (OUTPATIENT)
Dept: RESPIRATORY THERAPY | Facility: HOSPITAL | Age: 58
Discharge: HOME | End: 2018-07-26
Attending: INTERNAL MEDICINE
Payer: COMMERCIAL

## 2018-07-26 DIAGNOSIS — R06.00 DYSPNEA, UNSPECIFIED TYPE: ICD-10-CM

## 2018-07-26 PROCEDURE — 94729 DIFFUSING CAPACITY: CPT

## 2018-07-26 PROCEDURE — 94727 GAS DIL/WSHOT DETER LNG VOL: CPT

## 2018-07-26 PROCEDURE — 94010 BREATHING CAPACITY TEST: CPT

## 2018-07-31 ENCOUNTER — HOSPITAL ENCOUNTER (OUTPATIENT)
Dept: RADIOLOGY | Facility: CLINIC | Age: 58
Discharge: HOME | End: 2018-07-31
Attending: INTERNAL MEDICINE
Payer: COMMERCIAL

## 2018-07-31 DIAGNOSIS — Z78.0 ASYMPTOMATIC MENOPAUSAL STATE: ICD-10-CM

## 2018-07-31 PROCEDURE — 77080 DXA BONE DENSITY AXIAL: CPT | Mod: TC,PO

## 2018-07-31 PROCEDURE — 77080 DXA BONE DENSITY AXIAL: CPT | Mod: 26,,, | Performed by: INTERNAL MEDICINE

## 2018-08-09 ENCOUNTER — OFFICE VISIT (OUTPATIENT)
Dept: OPHTHALMOLOGY | Facility: CLINIC | Age: 58
End: 2018-08-09
Payer: COMMERCIAL

## 2018-08-09 DIAGNOSIS — H25.13 NUCLEAR SCLEROSIS, BILATERAL: Primary | ICD-10-CM

## 2018-08-09 DIAGNOSIS — H04.123 DRY EYES, BILATERAL: ICD-10-CM

## 2018-08-09 DIAGNOSIS — I10 ESSENTIAL HYPERTENSION: ICD-10-CM

## 2018-08-09 DIAGNOSIS — H52.7 REFRACTIVE ERROR: ICD-10-CM

## 2018-08-09 PROCEDURE — 99999 PR PBB SHADOW E&M-EST. PATIENT-LVL II: CPT | Mod: PBBFAC,,, | Performed by: OPHTHALMOLOGY

## 2018-08-09 PROCEDURE — 92014 COMPRE OPH EXAM EST PT 1/>: CPT | Mod: S$GLB,,, | Performed by: OPHTHALMOLOGY

## 2018-08-09 NOTE — PROGRESS NOTES
Subjective:       Patient ID: Livier Quintana is a 58 y.o. female.    Chief Complaint: Cataract    HPI     58 y.o. Female is here for Ocular Health Check. H/o Nuclear Sclerosis,   bilateral. Denies eye pain and flashes. Sometimes see floaters. Sometimes   eyes tear. No burning or itching. No noticeable VA changes with   correction. With glasses glare is bad at night while driving. Take glasses   off and glare is not bothersome.     Meds:Refresh BID OU     Last edited by NAKIA Saavedra on 8/9/2018 11:12 AM. (History)             Assessment:       1. Nuclear sclerosis, bilateral    2. Dry eyes, bilateral    3. Essential hypertension    4. Refractive error        Plan:       Cataracts- Not visually significant.  SKYE-Needs AT gel gtts qhs.  HTN-No retinopathy OU.  RE-Pt does not want MRx.      AT's.  AT gel gtts qhs OU.  Control HTN.  RTC 1 yr.

## 2018-08-16 DIAGNOSIS — M81.0 AGE-RELATED OSTEOPOROSIS WITHOUT CURRENT PATHOLOGICAL FRACTURE: Primary | ICD-10-CM

## 2018-08-16 RX ORDER — ALENDRONATE SODIUM 70 MG/1
70 TABLET ORAL
Qty: 4 TABLET | Refills: 11 | Status: SHIPPED | OUTPATIENT
Start: 2018-08-16 | End: 2019-08-22 | Stop reason: SDUPTHER

## 2018-08-20 ENCOUNTER — PATIENT MESSAGE (OUTPATIENT)
Dept: FAMILY MEDICINE | Facility: CLINIC | Age: 58
End: 2018-08-20

## 2018-08-20 ENCOUNTER — OFFICE VISIT (OUTPATIENT)
Dept: CARDIOLOGY | Facility: CLINIC | Age: 58
End: 2018-08-20
Payer: COMMERCIAL

## 2018-08-20 VITALS
WEIGHT: 135.13 LBS | HEART RATE: 78 BPM | BODY MASS INDEX: 24.72 KG/M2 | RESPIRATION RATE: 20 BRPM | DIASTOLIC BLOOD PRESSURE: 78 MMHG | OXYGEN SATURATION: 96 % | SYSTOLIC BLOOD PRESSURE: 118 MMHG

## 2018-08-20 DIAGNOSIS — R94.31 ABNORMAL EKG: ICD-10-CM

## 2018-08-20 DIAGNOSIS — J43.9 MIXED RESTRICTIVE AND OBSTRUCTIVE LUNG DISEASE: Primary | ICD-10-CM

## 2018-08-20 DIAGNOSIS — R07.9 CHEST PAIN, UNSPECIFIED TYPE: Primary | ICD-10-CM

## 2018-08-20 DIAGNOSIS — Z82.49 FAMILY HISTORY OF CORONARY ARTERY DISEASE: ICD-10-CM

## 2018-08-20 DIAGNOSIS — F41.9 ANXIETY: ICD-10-CM

## 2018-08-20 DIAGNOSIS — R06.02 SHORTNESS OF BREATH: ICD-10-CM

## 2018-08-20 DIAGNOSIS — R00.2 PALPITATIONS: ICD-10-CM

## 2018-08-20 DIAGNOSIS — J98.4 MIXED RESTRICTIVE AND OBSTRUCTIVE LUNG DISEASE: Primary | ICD-10-CM

## 2018-08-20 PROBLEM — J44.9 MIXED RESTRICTIVE AND OBSTRUCTIVE LUNG DISEASE: Status: ACTIVE | Noted: 2018-08-20

## 2018-08-20 PROCEDURE — 99204 OFFICE O/P NEW MOD 45 MIN: CPT | Mod: S$GLB,,, | Performed by: INTERNAL MEDICINE

## 2018-08-20 PROCEDURE — 99999 PR PBB SHADOW E&M-EST. PATIENT-LVL III: CPT | Mod: PBBFAC,,, | Performed by: INTERNAL MEDICINE

## 2018-08-20 PROCEDURE — 3078F DIAST BP <80 MM HG: CPT | Mod: CPTII,S$GLB,, | Performed by: INTERNAL MEDICINE

## 2018-08-20 PROCEDURE — 3008F BODY MASS INDEX DOCD: CPT | Mod: CPTII,S$GLB,, | Performed by: INTERNAL MEDICINE

## 2018-08-20 PROCEDURE — 93000 ELECTROCARDIOGRAM COMPLETE: CPT | Mod: S$GLB,,, | Performed by: INTERNAL MEDICINE

## 2018-08-20 PROCEDURE — 3074F SYST BP LT 130 MM HG: CPT | Mod: CPTII,S$GLB,, | Performed by: INTERNAL MEDICINE

## 2018-08-20 NOTE — TELEPHONE ENCOUNTER
Patient requesting PFT results. Results scanned in under media. Please review and advise. Thank you!

## 2018-08-20 NOTE — PROGRESS NOTES
Subjective:    Patient ID:  Livier Quintana is a 58 y.o. female who presents for evaluation of Anxiety; Shortness of Breath; Palpitations; and Hypertension      HPI  Patient is here for evaluation of palpitations.  She says she noticed them several months ago feels like been progressing a little bit.  She describes as a fluttering.  She can have these independently of other symptoms which she complained about including chest pain shortness of breath.  She says she mostly notices fluttering and nights when she lays down and can feeling here some hard heartbeats and in particular when laying down or left side.  She says on heavy activity she does get some substernal heaviness in notices more shortness of breath with lighter activity including climbing stairs.  She denies any PND, orthopnea or lower [place].  She has experienced dizziness but not to the point of presyncope or syncope.  She says she is interested in an exercise program mail on evaluation as she is well had a father who had open-heart surgery near her age currently.    Review of Systems   Constitution: Negative.   HENT: Negative.    Eyes: Negative.    Cardiovascular: Positive for chest pain, dyspnea on exertion, irregular heartbeat and palpitations. Negative for leg swelling, near-syncope, orthopnea, paroxysmal nocturnal dyspnea and syncope.   Respiratory: Negative for shortness of breath.    Skin: Negative.    Musculoskeletal: Negative.    Gastrointestinal: Negative for abdominal pain, constipation and diarrhea.   Genitourinary: Negative for dysuria.   Neurological: Negative.    Psychiatric/Behavioral: Negative.      Past Medical History:   Diagnosis Date    Amblyopia     Cataract     Family history of breast cancer     SISTER WITH RECURRENCE    Fissure, anal     inner     Hypertension     Nuclear sclerosis - Both Eyes 2012    Strabismus      Past Surgical History:   Procedure Laterality Date     SECTION, CLASSIC      x3     ENDOMETRIAL ABLATION  2011    [facility]---D&C HSCOPE/HTA    STRABISMUS SURGERY      X 2     Social History     Tobacco Use    Smoking status: Never Smoker    Smokeless tobacco: Never Used   Substance Use Topics    Alcohol use: No     Comment: socially    Drug use: No     Family History   Problem Relation Age of Onset    Amblyopia Mother     Rheum arthritis Mother     Cataracts Mother     Heart disease Father     Hypertension Father     Pulmonary fibrosis Father     Arrhythmia Father     Cataracts Father     Amblyopia Sister     Glaucoma Sister         narrow angle     Breast cancer Sister 32    Diabetes Maternal Grandmother     Ovarian cancer Neg Hx     Colon cancer Neg Hx     Melanoma Neg Hx     Psoriasis Neg Hx     Lupus Neg Hx     Blindness Neg Hx     Macular degeneration Neg Hx     Retinal detachment Neg Hx     Strabismus Neg Hx         Objective:    Physical Exam   Constitutional: She is oriented to person, place, and time. She appears well-developed and well-nourished.   HENT:   Head: Normocephalic and atraumatic.   Eyes: Conjunctivae and EOM are normal. Pupils are equal, round, and reactive to light.   Neck: Normal range of motion. Neck supple. No thyromegaly present.   Cardiovascular: Normal rate and regular rhythm.   No murmur heard.  Pulmonary/Chest: Effort normal and breath sounds normal. No respiratory distress.   Abdominal: Soft. Bowel sounds are normal.   Musculoskeletal: She exhibits no edema.   Neurological: She is alert and oriented to person, place, and time.   Skin: Skin is warm and dry.   Psychiatric: She has a normal mood and affect. Her behavior is normal.       EKG shows normal sinus rhythm with nonspecific ST-T changes    Assessment:       1. Chest pain, unspecified type    2. Shortness of breath    3. Palpitations    4. Abnormal EKG    5. Family history of coronary artery disease         Plan:       -several risk factors with current exertional symptoms, plan for baseline  testing including stress echo    Return to clinic in 1 month with testing now

## 2018-08-20 NOTE — LETTER
August 20, 2018      Juan Jose Guallpa MD  [address] [facility]  [place] [address]           [facility]  [address] [facility]  [place] [address]  Phone: [phone]          Patient: Livier Quintana   MR Number: [id number]   YOB: 1960   Date of Visit: 8/20/2018       Dear Dr. Juan Jose Guallpa:    Thank you for referring Livier Quintana to me for evaluation. Attached you will find relevant portions of my assessment and plan of care.    If you have questions, please do not hesitate to call me. I look forward to following Livier Quintana along with you.    Sincerely,    Michael Odom MD    Enclosure  CC:  No Recipients    If you would like to receive this communication electronically, please contact [email or web address] or [phone] to request more information on [employer] Link access.    For providers and/or their staff who would like to refer a patient to [facility], please contact us through our one-stop-shop provider referral line, [facility], at [phone].    If you feel you have received this communication in error or would no longer like to receive these types of communications, please e-mail [email or web address]

## 2018-08-23 ENCOUNTER — OFFICE VISIT (OUTPATIENT)
Dept: DERMATOLOGY | Facility: CLINIC | Age: 58
End: 2018-08-23
Payer: COMMERCIAL

## 2018-08-23 DIAGNOSIS — L81.4 SOLAR LENTIGO: ICD-10-CM

## 2018-08-23 DIAGNOSIS — L82.1 SK (SEBORRHEIC KERATOSIS): ICD-10-CM

## 2018-08-23 DIAGNOSIS — L73.8 SEBACEOUS HYPERPLASIA OF FACE: ICD-10-CM

## 2018-08-23 DIAGNOSIS — L82.0 INFLAMED SEBORRHEIC KERATOSIS: ICD-10-CM

## 2018-08-23 DIAGNOSIS — L40.8 SEBOPSORIASIS: ICD-10-CM

## 2018-08-23 DIAGNOSIS — D48.5 NEOPLASM OF UNCERTAIN BEHAVIOR OF SKIN: Primary | ICD-10-CM

## 2018-08-23 DIAGNOSIS — L71.8 ACNE ROSACEA, ERYTHEMATOUS TELANGIECTATIC TYPE: ICD-10-CM

## 2018-08-23 DIAGNOSIS — D22.9 MULTIPLE BENIGN NEVI: ICD-10-CM

## 2018-08-23 PROCEDURE — 88305 TISSUE EXAM BY PATHOLOGIST: CPT | Performed by: PATHOLOGY

## 2018-08-23 PROCEDURE — 11100 PR BIOPSY OF SKIN LESION: CPT | Mod: S$GLB,,, | Performed by: PATHOLOGY

## 2018-08-23 PROCEDURE — 99999 PR PBB SHADOW E&M-EST. PATIENT-LVL III: CPT | Mod: PBBFAC,,, | Performed by: PATHOLOGY

## 2018-08-23 PROCEDURE — 99214 OFFICE O/P EST MOD 30 MIN: CPT | Mod: 25,S$GLB,, | Performed by: PATHOLOGY

## 2018-08-23 RX ORDER — FLUOCINONIDE TOPICAL SOLUTION USP, 0.05% 0.5 MG/ML
SOLUTION TOPICAL
Qty: 60 ML | Refills: 3 | Status: SHIPPED | OUTPATIENT
Start: 2018-08-23 | End: 2019-10-31 | Stop reason: SDUPTHER

## 2018-08-23 RX ORDER — KETOCONAZOLE 20 MG/ML
SHAMPOO, SUSPENSION TOPICAL
Qty: 120 ML | Refills: 5 | Status: SHIPPED | OUTPATIENT
Start: 2018-08-23 | End: 2019-10-31 | Stop reason: SDUPTHER

## 2018-08-23 NOTE — PATIENT INSTRUCTIONS
Shave Biopsy Wound Care    Your doctor has performed a shave biopsy today.  A band aid and vaseline ointment has been placed over the site.  This should remain in place for 24 hours.  It is recommended that you keep the area dry for the first 24 hours.  After 24 hours, you may remove the band aid and wash the area with warm soap and water and apply Vaseline jelly.  Many patients prefer to use Neosporin or Bacitracin ointment.  This is acceptable; however, know that you can develop an allergy to this medication even if you have used it safely for years.  It is important to keep the area moist.  Letting it dry out and get air slows healing time, and will worsen the scar.  Band aid is optional after first 24 hours.      If you notice increasing redness, tenderness, pain, or yellow drainage at the biopsy site, please notify your doctor.  These are signs of an infection.    If your biopsy site is bleeding, apply firm pressure for 15 minutes straight.  Repeat for another 15 minutes, if it is still bleeding.   If the surgical site continues to bleed, then please contact your doctor.      [facility] [place] [address]/ [phone] FAX/ [email or web address]        CRYOSURGERY      Your doctor has used a method called cryosurgery to treat your skin condition. Cryosurgery refers to the use of very cold substances to treat a variety of skin conditions such as warts, pre-skin cancers, molluscum contagiosum, sun spots, and several benign growths. The substance we use in cryosurgery is liquid nitrogen and is so cold (-195 degrees Celsius) that is burns when administered.     Following treatment in the office, the skin may immediately burn and become red. You may find the area around the lesion is affected as well. It is sometimes necessary to treat not only the lesion, but a small area of the surrounding normal skin to achieve a good response.     A blister, and even a blood filled blister, may form  after treatment.   This is a normal response. If the blister is painful, it is acceptable to sterilize a needle and with rubbing alcohol and gently pop the blister. It is important that you gently wash the area with soap and warm water as the blister fluid may contain wart virus if a wart was treated. Do no remove the roof of the blister.     The area treated can take anywhere from 1-3 weeks to heal. Healing time depends on the kind skin lesion treated, the location, and how aggressively the lesion was treated. It is recommended that the areas treated are covered with Vaseline or bacitracin ointment and a band-aid. If a band-aid is not practical, just ointment applied several times per day will do. Keeping these areas moist will speed the healing time.    Treatment with liquid nitrogen can leave a scar. In dark skin, it may be a light or dark scar, in light skin it may be a white or pink scar. These will generally fade with time, but may never go away completely.     If you have any concerns after your treatment, please feel free to call the office.       [facility] [place] [address]/ [phone] FAX/ [email or web address]

## 2018-08-23 NOTE — PROGRESS NOTES
Subjective:       Patient ID:  Livier Quintana is a 58 y.o. female who presents for   Chief Complaint   Patient presents with    Skin Check     TBSE      HPI  Pt with no personal h/o skin cancer or dysplastic nevi.  H/o significant sun exposure - recreational fishing.  Has bothersome mole to upper chest that has enlarged slowly over time.  Denies any other new or changing lesions today.    Review of Systems   Constitutional: Negative for fever, chills, weight loss, weight gain, fatigue, night sweats and malaise.   Skin: Negative for daily sunscreen use, activity-related sunscreen use and recent sunburn.   Hematologic/Lymphatic: Does not bruise/bleed easily.        Objective:    Physical Exam   Constitutional: She appears well-developed and well-nourished. No distress.   Neurological: She is alert and oriented to person, place, and time. She is not disoriented.   Psychiatric: She has a normal mood and affect.   Skin:   Areas Examined (abnormalities noted in diagram):   Scalp / Hair Palpated and Inspected  Head / Face Inspection Performed  Neck Inspection Performed  Chest / Axilla Inspection Performed  Abdomen Inspection Performed  Genitals / Buttocks / Groin Inspection Performed  Back Inspection Performed  RUE Inspected  LUE Inspection Performed  RLE Inspected  LLE Inspection Performed  Nails and Digits Inspection Performed                   Diagram Legend     Erythematous scaling macule/papule c/w actinic keratosis       Vascular papule c/w angioma      Pigmented verrucoid papule/plaque c/w seborrheic keratosis      Yellow umbilicated papule c/w sebaceous hyperplasia      Irregularly shaped tan macule c/w lentigo     1-2 mm smooth white papules consistent with Milia      Movable subcutaneous cyst with punctum c/w epidermal inclusion cyst      Subcutaneous movable cyst c/w pilar cyst      Firm pink to brown papule c/w dermatofibroma      Pedunculated fleshy papule(s) c/w skin tag(s)      Evenly pigmented  macule c/w junctional nevus     Mildly variegated pigmented, slightly irregular-bordered macule c/w mildly atypical nevus      Flesh colored to evenly pigmented papule c/w intradermal nevus       Pink pearly papule/plaque c/w basal cell carcinoma      Erythematous hyperkeratotic cursted plaque c/w SCC      Surgical scar with no sign of skin cancer recurrence      Open and closed comedones      Inflammatory papules and pustules      Verrucoid papule consistent consistent with wart     Erythematous eczematous patches and plaques     Dystrophic onycholytic nail with subungual debris c/w onychomycosis     Umbilicated papule    Erythematous-base heme-crusted tan verrucoid plaque consistent with inflamed seborrheic keratosis     Erythematous Silvery Scaling Plaque c/w Psoriasis     See annotation      Assessment / Plan:   Rule Out - Biopsy 1: Nevus versus Seborrheic Keratosis versus Other.        Pathology Orders:     Normal Orders This Visit    Tissue Specimen To Pathology, Dermatology     Questions:    Directional Terms:  Other(comment)    Upper    Clinical information:  nevus vs SK; r/o atypia    Specific Site:  chest        Neoplasm of uncertain behavior of skin  -     Tissue Specimen To Pathology, Dermatology    Shave biopsy procedure note:    Shave biopsy performed after verbal consent including risk of infection, scar, recurrence, need for additional treatment of site. Area prepped with alcohol, anesthetized with approximately 1.0cc of 1% lidocaine with epinephrine. Lesional tissue shaved with razor blade. Hemostasis achieved with application of aluminum chloride followed by hyfrecation. No complications. Dressing applied. Wound care explained.        Acne rosacea, erythematous telangiectatic type - pt elects not to treat at this time    Inflamed seborrheic keratosis - left lateral leg.  These are benign inherited growths without a malignant potential. Reassurance given to patient. No treatment is necessary.        SK (seborrheic keratosis) - These are benign inherited growths without a malignant potential. Reassurance given to patient. No treatment is necessary.       Multiple benign nevi - Patient with several benign appearing nevi. Instructed patient to observe lesion(s) for changes and follow up in clinic if changes are noted.       Solar lentigo - Reassurance given to patient. No treatment is necessary.       Sebaceous hyperplasia of face - This is a common condition representing benign enlargement of the sebaceous lobule. It typically occurs in adulthood. Reassurance given to patient.       Sebopsoriasis  -     ketoconazole (NIZORAL) 2 % shampoo; Wash hair with medicated shampoo at least 2x/week - let sit on scalp at least 5 minutes prior to rinsing  Dispense: 120 mL; Refill: 5  -     fluocinonide (LIDEX) 0.05 % external solution; AAA scalp qday - bid prn pruritus  Dispense: 60 mL; Refill: 3             Follow-up in about 1 year (around 8/23/2019), or if symptoms worsen or fail to improve.

## 2018-10-05 ENCOUNTER — OFFICE VISIT (OUTPATIENT)
Dept: SLEEP MEDICINE | Facility: CLINIC | Age: 58
End: 2018-10-05
Payer: COMMERCIAL

## 2018-10-05 VITALS
HEART RATE: 83 BPM | WEIGHT: 134 LBS | BODY MASS INDEX: 24.51 KG/M2 | DIASTOLIC BLOOD PRESSURE: 80 MMHG | SYSTOLIC BLOOD PRESSURE: 116 MMHG | OXYGEN SATURATION: 99 %

## 2018-10-05 DIAGNOSIS — R09.81 NASAL CONGESTION: ICD-10-CM

## 2018-10-05 DIAGNOSIS — R94.2 ABNORMAL PFT: Primary | ICD-10-CM

## 2018-10-05 PROCEDURE — 99999 PR PBB SHADOW E&M-EST. PATIENT-LVL III: CPT | Mod: PBBFAC,,, | Performed by: INTERNAL MEDICINE

## 2018-10-05 PROCEDURE — 3074F SYST BP LT 130 MM HG: CPT | Mod: CPTII,S$GLB,, | Performed by: INTERNAL MEDICINE

## 2018-10-05 PROCEDURE — 3079F DIAST BP 80-89 MM HG: CPT | Mod: CPTII,S$GLB,, | Performed by: INTERNAL MEDICINE

## 2018-10-05 PROCEDURE — 3008F BODY MASS INDEX DOCD: CPT | Mod: CPTII,S$GLB,, | Performed by: INTERNAL MEDICINE

## 2018-10-05 PROCEDURE — 99204 OFFICE O/P NEW MOD 45 MIN: CPT | Mod: S$GLB,,, | Performed by: INTERNAL MEDICINE

## 2018-10-05 NOTE — LETTER
October 5, 2018      Juan Jose Guallpa MD  [address] [facility]  [place] [address]           [facility]  [address]  [place] [address]  Phone: [phone]  Fax: [phone]          Patient: Livier Quintana   MR Number: [id number]   YOB: 1960   Date of Visit: 10/5/2018       Dear Dr. Juan Jose Guallpa:    Thank you for referring Livier Quintana to me for evaluation. Attached you will find relevant portions of my assessment and plan of care.    If you have questions, please do not hesitate to call me. I look forward to following Livier Quintana along with you.    Sincerely,    Clarence Esquivel MD    Enclosure  CC:  No Recipients    If you would like to receive this communication electronically, please contact [email or web address] or [phone] to request more information on [employer] access.    For providers and/or their staff who would like to refer a patient to [facility], please contact us through our one-stop-shop provider referral line, [facility], at [phone].    If you feel you have received this communication in error or would no longer like to receive these types of communications, please e-mail [email or web address]

## 2018-10-05 NOTE — PROGRESS NOTES
Livier Quintana  was seen as a new patient at the request  Juan Jose Guallpa MD for the evaluation of  Abnormal pft.    CHIEF COMPLAINT:  Consult (J44.9,J98.4 (ICD-10-CM) - Mixed restrictive and obstructive lung disease)      HISTORY OF PRESENT ILLNESS: Livier Quintana is a 58 y.o. female  has a past medical history of Amblyopia, Cataract, Family history of breast cancer, Family history of pulmonary fibrosis, Fissure, anal, Hypertension, Nuclear sclerosis - Both Eyes (7/19/2012), and Strabismus.  Patient has strong family history of pulmonary fibrosis.  Father (70s), paternal uncle (50s) and aunt (60s) had pulmonary fibrosis.  Patient underwent pft for intermittent dyspnea.  pft with obstructive physiology.      Patient denied with intermittent cough.  Every other day.  No fever/chill.  No chest pain.  Denied penn with adl.  Active life style.  still babysit 3 grandchildren twice per week.  She cares for mother.  Can walk 1 mile without issue.      PAST MEDICAL HISTORY:    Active Ambulatory Problems     Diagnosis Date Noted    Nuclear sclerosis, bilateral 07/19/2012    Refractive error 09/12/2014    Essential hypertension 10/05/2015    Dry eyes, bilateral 10/05/2015    Family history of breast cancer in sister 02/23/2016    Anxiety, xanax for sleep mainly. 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 7/2018; never smoker 08/20/2018     Resolved Ambulatory Problems     Diagnosis Date Noted    Dry eyes 07/19/2012    Idiopathic hypertension 07/19/2012    Hypertension, benign 01/29/2013    Hyperopia - Both Eyes 08/12/2013    Astigmatism - Both Eyes 08/12/2013    Neck pain 06/23/2014    Upper extremity weakness 06/23/2014    Left arm pain 06/23/2014    Screening for colon cancer 03/09/2015    Refractive  error 10/05/2015     Past Medical History:   Diagnosis Date    Amblyopia     Cataract     Family history of breast cancer     Family history of pulmonary fibrosis     Fissure, anal     Hypertension     Nuclear sclerosis - Both Eyes 2012    Strabismus                 PAST SURGICAL HISTORY:    Past Surgical History:   Procedure Laterality Date     SECTION, CLASSIC      x3    COLONOSCOPY N/A 3/9/2015    Performed by Greg Vel[facility]quez MD at [facility] ([facility])    ENDOMETRIAL ABLATION      KJB---D&C HSCOPE/HTA    STRABISMUS SURGERY      X 2         FAMILY HISTORY:                Family History   Problem Relation Age of Onset    Amblyopia Mother     Rheum arthritis Mother     Cataracts Mother     Heart disease Father     Hypertension Father     Pulmonary fibrosis Father     Arrhythmia Father     Cataracts Father     Amblyopia Sister     Glaucoma Sister         narrow angle     Breast cancer Sister 32    Diabetes Maternal Grandmother     Ovarian cancer Neg Hx     Colon cancer Neg Hx     Melanoma Neg Hx     Psoriasis Neg Hx     Lupus Neg Hx     Blindness Neg Hx     Macular degeneration Neg Hx     Retinal detachment Neg Hx     Strabismus Neg Hx        SOCIAL HISTORY:          Tobacco:   Social History     Tobacco Use   Smoking Status Never Smoker   Smokeless Tobacco Never Used     alcohol use:    Social History     Substance and Sexual Activity   Alcohol Use No    Comment: socially               Occupation:  Former  for oil company    ALLERGIES:  Review of patient's allergies indicates:  No Known Allergies    CURRENT MEDICATIONS:    Current Outpatient Medications   Medication Sig Dispense Refill    alendronate (FOSAMAX) 70 MG tablet Take 1 tablet (70 mg total) by mouth every 7 days. 4 tablet 11    amLODIPine (NORVASC) 2.5 MG tablet Take 1 tablet (2.5 mg total) by mouth once daily. 90 tablet 3    fluocinonide (LIDEX) 0.05 % external solution AAA scalp qday -  bid prn pruritus 60 mL 3    hydrOXYzine HCl (ATARAX) 10 MG Tab Take 1 tablet (10 mg total) by mouth nightly as needed. 30 tablet 11    ketoconazole (NIZORAL) 2 % shampoo Wash hair with medicated shampoo at least 2x/week - let sit on scalp at least 5 minutes prior to rinsing 120 mL 5    naproxen (NAPROSYN) 500 MG tablet Take 1 tablet (500 mg total) by mouth 2 (two) times daily. 30 tablet 5    ALPRAZolam (XANAX) 0.25 MG tablet Take 1 tablet (0.25 mg total) by mouth nightly as needed for Anxiety. 60 tablet 0     No current facility-administered medications for this visit.                   REVIEW OF SYSTEMS:     Pulmonary related symptoms as per HPI.  Gen:  no weight loss, no fever, occasional night sweat  HEENT:  no visual changes, no sore throat, no hearing loss, deviated septum  CV:  No chest pain, no orthopnea, no PND  GI:  no melena, no hematochezia, no diarhea, no constipation.  :  no dysuria, no hematuria, no hesistancy, no dribbling  Neuro:  no syncope, no vertigo, no tinitus  Psych:  No homocide or suicide ideation; no depression.  Endocrine:  No heat or cold intolerance.  Sleep:  No snoring; no witnessed apnea.  Rested upon awake.    Otherwise, a balance of systems reviewed is negative.          PHYSICAL EXAM:  Vitals:    10/05/18 1307   BP: 116/80   Pulse: 83   SpO2: 99%   Weight: 60.8 kg (134 lb)   PainSc: 0-No pain     Body mass index is 24.51 kg/m².     GENERAL:  well develop; no apparent distress  HEENT:  +mild nasal congestion; no discharge noted; class 3 modified mallampatti.   NECK:  supple; no palpable masses.  CARDIO: regular rate and rhythm  PULM:  clear to auscultation bilaterally; no intercostals retractions; no accessory muscle usage   ABDOMEN:  soft nontender/nondistended.  +bowel sound  EXTREMITIES no cce  NEURO:  CN II-XII intact.  5/5 motor in all extremities.  sensation grossly intact   to light touch.  PSYCH:  normal affect.  Alert and oriented x 4    LABS  Pulmonary Functions  Testing Results: 7/26/18 poor initial effort.  Ratio of 48%; fvc 93%; fev1 56%; tlc 99%; dlco 65%  ABG none  CXR:  7/23/18 no increased in reticulation; no effusion or consolidation.  CT CHEST:  none    ASSESSMENT    ICD-10-CM ICD-9-CM    1. Abnormal PFT R94.2 794.2    2. Nasal congestion R09.81 478.19        PLAN:  Abnormal pft - there are a couple of technical issue with pft.  poor initial effort on spirometry.  dlco manuver with poor lung volume.  I've recommend repeat pft.  Patient declined.  No desat with walking during office evaluation.  No evidence of parenchymal lung disease based upon chest x-ray.  Although, cxr has poor sensitivity for early ild.  Clinically, patient without significant pulmonary complaints.  Patient is committing to begin an exercise program.  She will contact us if she experience any form of dyspnea.     Nasal congestion - nasal steroid and sinus irrigation.    Patient will No Follow-up on file. with md/np.    CC: Send copy of this note to Juan Jose Guallpa MD

## 2018-10-05 NOTE — PATIENT INSTRUCTIONS
1.  NeilMed Sinus Rinse Regular Kit    Recipe 1/4 teaspoon of salt and 1/4 teaspoon of baking soda in distilled water.  Be sure to tilt head forward as shown in picture.          1.  NeilMed Sinus Rinse Regular Kit    flonase or nasonex over the counter.  2 sprays per nostril daily. Be sure to tilt head forward when dispensing medication.

## 2019-08-05 ENCOUNTER — PATIENT OUTREACH (OUTPATIENT)
Dept: ADMINISTRATIVE | Facility: HOSPITAL | Age: 59
End: 2019-08-05

## 2019-08-05 ENCOUNTER — TELEPHONE (OUTPATIENT)
Dept: ADMINISTRATIVE | Facility: HOSPITAL | Age: 59
End: 2019-08-05

## 2019-08-22 DIAGNOSIS — M81.0 AGE-RELATED OSTEOPOROSIS WITHOUT CURRENT PATHOLOGICAL FRACTURE: ICD-10-CM

## 2019-08-22 RX ORDER — ALENDRONATE SODIUM 70 MG/1
TABLET ORAL
Qty: 4 TABLET | Refills: 11 | Status: SHIPPED | OUTPATIENT
Start: 2019-08-22 | End: 2020-10-19 | Stop reason: SDUPTHER

## 2019-08-30 ENCOUNTER — TELEPHONE (OUTPATIENT)
Dept: FAMILY MEDICINE | Facility: CLINIC | Age: 59
End: 2019-08-30

## 2019-08-30 DIAGNOSIS — E55.9 VITAMIN D DEFICIENCY: ICD-10-CM

## 2019-08-30 DIAGNOSIS — I10 ESSENTIAL HYPERTENSION: Chronic | ICD-10-CM

## 2019-08-30 DIAGNOSIS — Z00.00 NORMAL PHYSICAL EXAM: Primary | ICD-10-CM

## 2019-08-30 NOTE — TELEPHONE ENCOUNTER
----- Message from Candacekanu Martínez sent at 8/30/2019 11:24 AM CDT -----  Contact: Self  Type: Lab    Caller is requesting to schedule their Lab appointment prior to annual appointment.    Order is not listed in EPIC.  Please enter order and contact patient to schedule.    Name of Caller:Self    Preferred Date and Time of Labs:anytime    Date of EPP Appointment:9/10    Where would they like the lab performed?[facility]    Would the patient rather a call back or a response via [facility]? Call back    Best Call Back Number:[phone]

## 2019-09-05 ENCOUNTER — LAB VISIT (OUTPATIENT)
Dept: LAB | Facility: HOSPITAL | Age: 59
End: 2019-09-05
Attending: INTERNAL MEDICINE
Payer: COMMERCIAL

## 2019-09-05 DIAGNOSIS — E55.9 VITAMIN D DEFICIENCY: ICD-10-CM

## 2019-09-05 DIAGNOSIS — Z00.00 NORMAL PHYSICAL EXAM: ICD-10-CM

## 2019-09-05 LAB
25(OH)D3+25(OH)D2 SERPL-MCNC: 36 NG/ML (ref 30–96)
ALBUMIN SERPL BCP-MCNC: 4.1 G/DL (ref 3.5–5.2)
ALP SERPL-CCNC: 105 U/L (ref 55–135)
ALT SERPL W/O P-5'-P-CCNC: 12 U/L (ref 10–44)
ANION GAP SERPL CALC-SCNC: 9 MMOL/L (ref 8–16)
AST SERPL-CCNC: 19 U/L (ref 10–40)
BASOPHILS # BLD AUTO: 0.05 K/UL (ref 0–0.2)
BASOPHILS NFR BLD: 0.7 % (ref 0–1.9)
BILIRUB SERPL-MCNC: 0.4 MG/DL (ref 0.1–1)
BUN SERPL-MCNC: 18 MG/DL (ref 6–20)
CALCIUM SERPL-MCNC: 9.7 MG/DL (ref 8.7–10.5)
CHLORIDE SERPL-SCNC: 105 MMOL/L (ref 95–110)
CHOLEST SERPL-MCNC: 219 MG/DL (ref 120–199)
CHOLEST/HDLC SERPL: 3.3 {RATIO} (ref 2–5)
CO2 SERPL-SCNC: 26 MMOL/L (ref 23–29)
CREAT SERPL-MCNC: 0.8 MG/DL (ref 0.5–1.4)
DIFFERENTIAL METHOD: NORMAL
EOSINOPHIL # BLD AUTO: 0.2 K/UL (ref 0–0.5)
EOSINOPHIL NFR BLD: 2.7 % (ref 0–8)
ERYTHROCYTE [DISTWIDTH] IN BLOOD BY AUTOMATED COUNT: 12.9 % (ref 11.5–14.5)
EST. GFR  (AFRICAN AMERICAN): >60 ML/MIN/1.73 M^2
EST. GFR  (NON AFRICAN AMERICAN): >60 ML/MIN/1.73 M^2
GLUCOSE SERPL-MCNC: 90 MG/DL (ref 70–110)
HCT VFR BLD AUTO: 39.1 % (ref 37–48.5)
HDLC SERPL-MCNC: 67 MG/DL (ref 40–75)
HDLC SERPL: 30.6 % (ref 20–50)
HGB BLD-MCNC: 12.7 G/DL (ref 12–16)
IMM GRANULOCYTES # BLD AUTO: 0.02 K/UL (ref 0–0.04)
IMM GRANULOCYTES NFR BLD AUTO: 0.3 % (ref 0–0.5)
LDLC SERPL CALC-MCNC: 133.2 MG/DL (ref 63–159)
LYMPHOCYTES # BLD AUTO: 2.3 K/UL (ref 1–4.8)
LYMPHOCYTES NFR BLD: 31.3 % (ref 18–48)
MCH RBC QN AUTO: 30.2 PG (ref 27–31)
MCHC RBC AUTO-ENTMCNC: 32.5 G/DL (ref 32–36)
MCV RBC AUTO: 93 FL (ref 82–98)
MONOCYTES # BLD AUTO: 0.6 K/UL (ref 0.3–1)
MONOCYTES NFR BLD: 7.7 % (ref 4–15)
NEUTROPHILS # BLD AUTO: 4.2 K/UL (ref 1.8–7.7)
NEUTROPHILS NFR BLD: 57.3 % (ref 38–73)
NONHDLC SERPL-MCNC: 152 MG/DL
NRBC BLD-RTO: 0 /100 WBC
PLATELET # BLD AUTO: 249 K/UL (ref 150–350)
PMV BLD AUTO: 11.3 FL (ref 9.2–12.9)
POTASSIUM SERPL-SCNC: 4.4 MMOL/L (ref 3.5–5.1)
PROT SERPL-MCNC: 7.4 G/DL (ref 6–8.4)
RBC # BLD AUTO: 4.21 M/UL (ref 4–5.4)
SODIUM SERPL-SCNC: 140 MMOL/L (ref 136–145)
TRIGL SERPL-MCNC: 94 MG/DL (ref 30–150)
WBC # BLD AUTO: 7.28 K/UL (ref 3.9–12.7)

## 2019-09-05 PROCEDURE — 85025 COMPLETE CBC W/AUTO DIFF WBC: CPT

## 2019-09-05 PROCEDURE — 80053 COMPREHEN METABOLIC PANEL: CPT

## 2019-09-05 PROCEDURE — 82306 VITAMIN D 25 HYDROXY: CPT

## 2019-09-05 PROCEDURE — 36415 COLL VENOUS BLD VENIPUNCTURE: CPT | Mod: PO

## 2019-09-05 PROCEDURE — 80061 LIPID PANEL: CPT

## 2019-09-09 NOTE — PROGRESS NOTES
This note was created by combination of typed  and Dragon dictation.  Transcription errors may be present.  If there are any questions, please contact me.    Assessment / Plan:   Normal physical exam  -pre visit labs reviewed.  Mildly elevated lipid profile but 10 year risk score is low, no statin at this time.  Work on diet and physical activity.  She notes what sounds to be periodic stress urinary incontinence.  She is due for follow-up with gynecology and I have recommended that she start there, look for anatomic changes such as prolapse.  She has already been doing Kegel exercises.  -     CBC auto differential; Future; Expected date: 09/09/2020  -     Comprehensive metabolic panel; Future; Expected date: 09/09/2020  -     Lipid panel; Future; Expected date: 09/09/2020    Anxiety, xanax for sleep mainly. melatonin with SE  -she notes rare use of alprazolam.  She is aware of the high risk nature of this medication.  She is requesting 30 tablets.  Last refill a little more than a year ago, 60 tablets have lasted her a year and I am hoping that 30 tablets will last her year as well.  She can continue to take the hydroxyzine at night as needed.  -     hydrOXYzine HCl (ATARAX) 10 MG Tab; Take 1 tablet (10 mg total) by mouth nightly as needed.  Dispense: 30 tablet; Refill: 11  -     ALPRAZolam (XANAX) 0.25 MG tablet; Take 1 tablet (0.25 mg total) by mouth nightly as needed for Anxiety.  Dispense: 30 tablet; Refill: 0    Arthralgia, unspecified joint  -refilled naproxen to take as needed  -     naproxen (NAPROSYN) 500 MG tablet; Take 1 tablet (500 mg total) by mouth 2 (two) times daily.  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  -     amLODIPine (NORVASC) 2.5 MG tablet; Take 1 tablet (2.5 mg total) by mouth once daily.  Dispense:  90 tablet; Refill: 3    Encounter for screening mammogram for malignant neoplasm of breast  -future mammogram ordered  -     Mammo Digital Screening Bilat; Future; Expected date: 09/10/2019    Rib pain on left side  -she gets this with leaning over and applying pressure to her ribcage while doing household chores.  She has a family history of pulmonary fibrosis and I think she is concerned about lung processes.  Normal lung exam.  I offered her x-ray of the ribs and she declines.  She will notify me if symptoms worsen or persist    Age-related osteoporosis without current pathological fracture on DEXA 8/2018  -check future vitamin-D.  Plan for repeat bone density scan around 2021.  She is taking alendronate.  Recently refilled in August for 1 year  -     Vitamin D; Future; Expected date: 09/09/2020    Medications Discontinued During This Encounter   Medication Reason    hydrOXYzine HCl (ATARAX) 10 MG Tab Reorder    naproxen (NAPROSYN) 500 MG tablet Reorder    ALPRAZolam (XANAX) 0.25 MG tablet Reorder    amLODIPine (NORVASC) 2.5 MG tablet Reorder       meds sent this encounter:  Medications Ordered This Encounter   Medications    ALPRAZolam (XANAX) 0.25 MG tablet     Sig: Take 1 tablet (0.25 mg total) by mouth nightly as needed for Anxiety.     Dispense:  30 tablet     Refill:  0    amLODIPine (NORVASC) 2.5 MG tablet     Sig: Take 1 tablet (2.5 mg total) by mouth once daily.     Dispense:  90 tablet     Refill:  3    hydrOXYzine HCl (ATARAX) 10 MG Tab     Sig: Take 1 tablet (10 mg total) by mouth nightly as needed.     Dispense:  30 tablet     Refill:  11    naproxen (NAPROSYN) 500 MG tablet     Sig: Take 1 tablet (500 mg total) by mouth 2 (two) times daily.     Dispense:  30 tablet     Refill:  5       Follow Up: Follow up in about 1 year (around 9/10/2020) for physical exam - fasting.  Pre visit labs ordered      Subjective:     Chief Complaint   Patient presents with    Annual Exam    Medication Refill        STAR Maier is a 59 y.o. female, last appointment with this clinic was Visit date not found.    Patient's last menstrual period was 09/01/2012.    Last seen last year  Anxiety, encouraged to try hydroxyzine instead of xanax  Vit d deficient  a1c was normal  Lipid higher than ideal 10 year ASCVD < 7%  Had ordered PFTs. These showed obstruction. fam hx of pulm fibrosis. Referred to pulmonology.  Saw cardiology 8/2018 - plan was stress echo.    Labs normal. Borderline lipid.    High stress - taking care of mother, children and grandchildren.  with mental health issues.    Taking fosamax. And vit D supplement.    She notes that it has happened before, that if she is leaning over something like leaning over a tab or leaning over the washing machine and applying pressure to her ribcage, sometimes she will feel a snapping sensation in have pain, usually to on the left side, and this can last for several weeks.  No overlying bruising.  She did it again recently and feels a little bit more severe than usual.  No fevers, no chills, no bruising, no swelling.  It is hard for her to us knees, cough, or take a deep breath.  Minimal cough.  No fevers, no chills.  Sometimes a sensation of dyspnea.  I did a chest x-ray for her last year and it was normal.    Notes stress urinary incontinence - small amount. Complete void. No pain no burning. Is due to follow up with gyn.  I would recommend she see gynecology to look for anatomic changes such as prolapse.  Or atrophy.    Popping sensation in the left ear.  Hx of septal deviation.   Incidental dried blood on the left nares on exam.  She is completely asymptomatic.  I see no ulceration or mass or discoloration and I would just monitor this.    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 melatonin with SE 2017    Family history of breast cancer in sister 2016    Essential hypertension 10/05/2015    Dry eyes, bilateral 10/05/2015    Screening for colon cancer  normal repeat 10 years 2015     3/9/2015 colonoscopy normal repeat 10 years      Refractive error 2014    Nuclear sclerosis, bilateral 2012       PAST MEDICAL HISTORY:  Past Medical History:   Diagnosis Date    Amblyopia     Cataract     Family history of breast cancer     SISTER WITH RECURRENCE    Family history of pulmonary fibrosis     Fissure, anal     inner     Hypertension     Nuclear sclerosis - Both Eyes 2012    Strabismus        PAST SURGICAL HISTORY:  Past Surgical History:   Procedure Laterality Date     SECTION, CLASSIC      x3    COLONOSCOPY N/A 3/9/2015    Performed by Greg [facility] at [facility] ([facility])    ENDOMETRIAL ABLATION      [facility]---D&C HSCOPE/HTA    STRABISMUS SURGERY      X 2       SOCIAL HISTORY:  Social History     Socioeconomic History    Marital status:      Spouse name: Not on file    Number of children: Not on file    Years of education: Not on file    Highest education level: Not on file   Occupational History    Not on file   Social Needs    Financial resource strain: Not on file    Food insecurity:     Worry: Not on file     Inability: Not on file    Transportation needs:     Medical: Not on file     Non-medical: Not on file   Tobacco Use    Smoking status: Never Smoker    Smokeless tobacco: Never Used   Substance and Sexual Activity    Alcohol use: No     Comment: socially    Drug use: No    Sexual activity: Not Currently     Partners: Male   Lifestyle    Physical activity:     Days per week: Not on file      Minutes per session: Not on file    Stress: Not at all   Relationships    Social connections:     Talks on phone: Not on file     Gets together: Not on file     Attends Zoroastrianism service: Not on file     Active member of club or organization: Not on file     Attends meetings of clubs or organizations: Not on file     Relationship status: Not on file   Other Topics Concern    Are you pregnant or think you may be? Not Asked    Breast-feeding Not Asked   Social History Narrative    Not on file        ALLERGIES AND MEDICATIONS: updated and reviewed.  Review of patient's allergies indicates:  No Known Allergies  Current Outpatient Medications   Medication Sig Dispense Refill    alendronate (FOSAMAX) 70 MG tablet TAKE ONE TABLET BY MOUTH EVERY 7 DAYS 4 tablet 11    amLODIPine (NORVASC) 2.5 MG tablet Take 1 tablet (2.5 mg total) by mouth once daily. 90 tablet 3    hydrOXYzine HCl (ATARAX) 10 MG Tab Take 1 tablet (10 mg total) by mouth nightly as needed. 30 tablet 11    ketoconazole (NIZORAL) 2 % shampoo Wash hair with medicated shampoo at least 2x/week - let sit on scalp at least 5 minutes prior to rinsing 120 mL 5    naproxen (NAPROSYN) 500 MG tablet Take 1 tablet (500 mg total) by mouth 2 (two) times daily. 30 tablet 5    ALPRAZolam (XANAX) 0.25 MG tablet Take 1 tablet (0.25 mg total) by mouth nightly as needed for Anxiety. 60 tablet 0    fluocinonide (LIDEX) 0.05 % external solution AAA scalp qday - bid prn pruritus 60 mL 3     No current facility-administered medications for this visit.        Review of Systems   Constitutional: Negative for fever, malaise/fatigue and weight loss.   HENT: Negative for congestion.    Eyes: Negative for blurred vision and pain.   Respiratory: Negative for shortness of breath and wheezing.    Cardiovascular: Positive for chest pain. Negative for palpitations and leg swelling.   Gastrointestinal: Negative for abdominal pain, blood in stool, constipation, diarrhea and  "heartburn.   Genitourinary: Negative for dysuria, hematuria and urgency.   Musculoskeletal: Negative for joint pain.   Neurological: Negative for tingling, focal weakness, weakness and headaches.       Objective:   Physical Exam   Vitals:    09/10/19 1342   BP: 114/72   BP Location: Right arm   Patient Position: Sitting   BP Method: Medium (Manual)   Pulse: 74   Temp: 98 °F (36.7 °C)   TempSrc: Oral   SpO2: 95%   Weight: 60.2 kg (132 lb 9.7 oz)   Height: 5' 2" (1.575 m)    Body mass index is 24.25 kg/m².  Weight: 60.2 kg (132 lb 9.7 oz)   Height: 5' 2" (157.5 cm)     Physical Exam   Constitutional: She is oriented to person, place, and time. She appears well-developed and well-nourished.   HENT:   Right Ear: Tympanic membrane, external ear and ear canal normal.   Left Ear: Tympanic membrane, external ear and ear canal normal.   Mouth/Throat: Oropharynx is clear and moist.   The left nares anterior chamber with a small amount of dried blood without ulceration, erythema, mass   Eyes: Pupils are equal, round, and reactive to light. EOM are normal. No scleral icterus.   Neck: Neck supple. No thyromegaly present.   Cardiovascular: Normal rate, regular rhythm and normal heart sounds.   No murmur heard.  Pulmonary/Chest: Effort normal and breath sounds normal. She has no wheezes.   Abdominal: Soft. She exhibits no mass. There is no splenomegaly or hepatomegaly. There is no tenderness.   Musculoskeletal: Normal range of motion. She exhibits no edema or deformity.   Left rib cage mid axillary line TTP around rib 4-5 without deformity no induration no crepitus.   Lymphadenopathy:     She has no cervical adenopathy.   Neurological: She is alert and oriented to person, place, and time. She has normal reflexes.   Skin: Skin is warm and dry. No rash noted.   On exposed skin   Psychiatric: She has a normal mood and affect. Her behavior is normal. Judgment and thought content normal.        Component      Latest Ref Rng & Units " 9/5/2019   WBC      3.90 - 12.70 K/uL 7.28   RBC      4.00 - 5.40 M/uL 4.21   Hemoglobin      12.0 - 16.0 g/dL 12.7   Hematocrit      37.0 - 48.5 % 39.1   MCV      82 - 98 fL 93   MCH      27.0 - 31.0 pg 30.2   MCHC      32.0 - 36.0 g/dL 32.5   RDW      11.5 - 14.5 % 12.9   Platelets      150 - 350 K/uL 249   MPV      9.2 - 12.9 fL 11.3   Immature Granulocytes      0.0 - 0.5 % 0.3   Gran # (ANC)      1.8 - 7.7 K/uL 4.2   Immature Grans (Abs)      0.00 - 0.04 K/uL 0.02   Lymph #      1.0 - 4.8 K/uL 2.3   Mono #      0.3 - 1.0 K/uL 0.6   Eos #      0.0 - 0.5 K/uL 0.2   Baso #      0.00 - 0.20 K/uL 0.05   nRBC      0 /100 WBC 0   Gran%      38.0 - 73.0 % 57.3   Lymph%      18.0 - 48.0 % 31.3   Mono%      4.0 - 15.0 % 7.7   Eosinophil%      0.0 - 8.0 % 2.7   Basophil%      0.0 - 1.9 % 0.7   Differential Method       Automated   Sodium      136 - 145 mmol/L 140   Potassium      3.5 - 5.1 mmol/L 4.4   Chloride      95 - 110 mmol/L 105   CO2      23 - 29 mmol/L 26   Glucose      70 - 110 mg/dL 90   BUN, Bld      6 - 20 mg/dL 18   Creatinine      0.5 - 1.4 mg/dL 0.8   Calcium      8.7 - 10.5 mg/dL 9.7   PROTEIN TOTAL      6.0 - 8.4 g/dL 7.4   Albumin      3.5 - 5.2 g/dL 4.1   BILIRUBIN TOTAL      0.1 - 1.0 mg/dL 0.4   Alkaline Phosphatase      55 - 135 U/L 105   AST      10 - 40 U/L 19   ALT      10 - 44 U/L 12   Anion Gap      8 - 16 mmol/L 9   eGFR if African American      >60 mL/min/1.73 m:2 >60.0   eGFR if non African American      >60 mL/min/1.73 m:2 >60.0   Cholesterol      120 - 199 mg/dL 219 (H)   Triglycerides      30 - 150 mg/dL 94   HDL      40 - 75 mg/dL 67   LDL Cholesterol External      63.0 - 159.0 mg/dL 133.2   Hdl/Cholesterol Ratio      20.0 - 50.0 % 30.6   Total Cholesterol/HDL Ratio      2.0 - 5.0 3.3   Non-HDL Cholesterol      mg/dL 152   Vit D, 25-Hydroxy      30 - 96 ng/mL 36

## 2019-09-10 ENCOUNTER — OFFICE VISIT (OUTPATIENT)
Dept: FAMILY MEDICINE | Facility: CLINIC | Age: 59
End: 2019-09-10
Payer: COMMERCIAL

## 2019-09-10 VITALS
OXYGEN SATURATION: 95 % | TEMPERATURE: 98 F | DIASTOLIC BLOOD PRESSURE: 72 MMHG | WEIGHT: 132.63 LBS | BODY MASS INDEX: 24.41 KG/M2 | HEART RATE: 74 BPM | HEIGHT: 62 IN | SYSTOLIC BLOOD PRESSURE: 114 MMHG

## 2019-09-10 DIAGNOSIS — M81.0 AGE-RELATED OSTEOPOROSIS WITHOUT CURRENT PATHOLOGICAL FRACTURE: ICD-10-CM

## 2019-09-10 DIAGNOSIS — J43.9 MIXED RESTRICTIVE AND OBSTRUCTIVE LUNG DISEASE: ICD-10-CM

## 2019-09-10 DIAGNOSIS — J98.4 MIXED RESTRICTIVE AND OBSTRUCTIVE LUNG DISEASE: ICD-10-CM

## 2019-09-10 DIAGNOSIS — I10 ESSENTIAL HYPERTENSION: Chronic | ICD-10-CM

## 2019-09-10 DIAGNOSIS — Z00.00 NORMAL PHYSICAL EXAM: Primary | ICD-10-CM

## 2019-09-10 DIAGNOSIS — Z12.31 ENCOUNTER FOR SCREENING MAMMOGRAM FOR MALIGNANT NEOPLASM OF BREAST: ICD-10-CM

## 2019-09-10 DIAGNOSIS — M25.50 ARTHRALGIA, UNSPECIFIED JOINT: ICD-10-CM

## 2019-09-10 DIAGNOSIS — F41.9 ANXIETY: Chronic | ICD-10-CM

## 2019-09-10 DIAGNOSIS — R07.81 RIB PAIN ON LEFT SIDE: ICD-10-CM

## 2019-09-10 PROCEDURE — 3078F PR MOST RECENT DIASTOLIC BLOOD PRESSURE < 80 MM HG: ICD-10-PCS | Mod: CPTII,S$GLB,, | Performed by: INTERNAL MEDICINE

## 2019-09-10 PROCEDURE — 99999 PR PBB SHADOW E&M-EST. PATIENT-LVL III: ICD-10-PCS | Mod: PBBFAC,,, | Performed by: INTERNAL MEDICINE

## 2019-09-10 PROCEDURE — 3074F SYST BP LT 130 MM HG: CPT | Mod: CPTII,S$GLB,, | Performed by: INTERNAL MEDICINE

## 2019-09-10 PROCEDURE — 99396 PR PREVENTIVE VISIT,EST,40-64: ICD-10-PCS | Mod: S$GLB,,, | Performed by: INTERNAL MEDICINE

## 2019-09-10 PROCEDURE — 99999 PR PBB SHADOW E&M-EST. PATIENT-LVL III: CPT | Mod: PBBFAC,,, | Performed by: INTERNAL MEDICINE

## 2019-09-10 PROCEDURE — 3078F DIAST BP <80 MM HG: CPT | Mod: CPTII,S$GLB,, | Performed by: INTERNAL MEDICINE

## 2019-09-10 PROCEDURE — 3074F PR MOST RECENT SYSTOLIC BLOOD PRESSURE < 130 MM HG: ICD-10-PCS | Mod: CPTII,S$GLB,, | Performed by: INTERNAL MEDICINE

## 2019-09-10 PROCEDURE — 99396 PREV VISIT EST AGE 40-64: CPT | Mod: S$GLB,,, | Performed by: INTERNAL MEDICINE

## 2019-09-10 RX ORDER — NAPROXEN 500 MG/1
500 TABLET ORAL 2 TIMES DAILY
Qty: 30 TABLET | Refills: 5 | Status: SHIPPED | OUTPATIENT
Start: 2019-09-10 | End: 2023-10-11

## 2019-09-10 RX ORDER — ALPRAZOLAM 0.25 MG/1
0.25 TABLET ORAL NIGHTLY PRN
Qty: 30 TABLET | Refills: 0 | Status: SHIPPED | OUTPATIENT
Start: 2019-09-10 | End: 2020-10-19 | Stop reason: SDUPTHER

## 2019-09-10 RX ORDER — HYDROXYZINE HYDROCHLORIDE 10 MG/1
10 TABLET, FILM COATED ORAL NIGHTLY PRN
Qty: 30 TABLET | Refills: 11 | Status: SHIPPED | OUTPATIENT
Start: 2019-09-10 | End: 2020-10-19

## 2019-09-10 RX ORDER — AMLODIPINE BESYLATE 2.5 MG/1
2.5 TABLET ORAL DAILY
Qty: 90 TABLET | Refills: 3 | Status: SHIPPED | OUTPATIENT
Start: 2019-09-10 | End: 2020-09-14

## 2019-09-24 ENCOUNTER — HOSPITAL ENCOUNTER (OUTPATIENT)
Dept: RADIOLOGY | Facility: HOSPITAL | Age: 59
Discharge: HOME | End: 2019-09-24
Attending: INTERNAL MEDICINE
Payer: COMMERCIAL

## 2019-09-24 DIAGNOSIS — Z12.31 ENCOUNTER FOR SCREENING MAMMOGRAM FOR MALIGNANT NEOPLASM OF BREAST: ICD-10-CM

## 2019-09-24 PROCEDURE — 77063 BREAST TOMOSYNTHESIS BI: CPT | Mod: 26,,, | Performed by: RADIOLOGY

## 2019-09-24 PROCEDURE — 77063 MAMMO DIGITAL SCREENING BILAT WITH TOMOSYNTHESIS_CAD: ICD-10-PCS | Mod: 26,,, | Performed by: RADIOLOGY

## 2019-09-24 PROCEDURE — 77067 SCR MAMMO BI INCL CAD: CPT | Mod: 26,,, | Performed by: RADIOLOGY

## 2019-09-24 PROCEDURE — 77067 SCR MAMMO BI INCL CAD: CPT | Mod: TC

## 2019-09-24 PROCEDURE — 77067 MAMMO DIGITAL SCREENING BILAT WITH TOMOSYNTHESIS_CAD: ICD-10-PCS | Mod: 26,,, | Performed by: RADIOLOGY

## 2019-09-25 ENCOUNTER — TELEPHONE (OUTPATIENT)
Dept: DERMATOLOGY | Facility: CLINIC | Age: 59
End: 2019-09-25

## 2019-09-25 NOTE — TELEPHONE ENCOUNTER
Spoke with pt and was able to schedule her appointment with Dr. Clemens. Pt stated she would like to be added to the wait list for a sooner appointment time.

## 2019-10-29 ENCOUNTER — PATIENT OUTREACH (OUTPATIENT)
Dept: ADMINISTRATIVE | Facility: OTHER | Age: 59
End: 2019-10-29

## 2019-10-31 ENCOUNTER — OFFICE VISIT (OUTPATIENT)
Dept: DERMATOLOGY | Facility: CLINIC | Age: 59
End: 2019-10-31
Payer: COMMERCIAL

## 2019-10-31 DIAGNOSIS — L40.8 SEBOPSORIASIS: ICD-10-CM

## 2019-10-31 DIAGNOSIS — Z12.83 SCREENING EXAM FOR SKIN CANCER: ICD-10-CM

## 2019-10-31 DIAGNOSIS — D48.5 NEOPLASM OF UNCERTAIN BEHAVIOR OF SKIN: Primary | ICD-10-CM

## 2019-10-31 DIAGNOSIS — D18.01 ANGIOMA OF SKIN: ICD-10-CM

## 2019-10-31 DIAGNOSIS — L81.4 SOLAR LENTIGO: ICD-10-CM

## 2019-10-31 DIAGNOSIS — L73.8 SEBACEOUS HYPERPLASIA OF FACE: ICD-10-CM

## 2019-10-31 DIAGNOSIS — L82.1 SK (SEBORRHEIC KERATOSIS): ICD-10-CM

## 2019-10-31 DIAGNOSIS — D22.9 MULTIPLE BENIGN NEVI: ICD-10-CM

## 2019-10-31 DIAGNOSIS — L21.9 SEBORRHEIC DERMATITIS OF SCALP: ICD-10-CM

## 2019-10-31 PROCEDURE — 88305 TISSUE EXAM BY PATHOLOGIST: CPT | Mod: 26,,, | Performed by: PATHOLOGY

## 2019-10-31 PROCEDURE — 99214 OFFICE O/P EST MOD 30 MIN: CPT | Mod: 25,S$GLB,, | Performed by: PATHOLOGY

## 2019-10-31 PROCEDURE — 11102 TANGNTL BX SKIN SINGLE LES: CPT | Mod: S$GLB,,, | Performed by: PATHOLOGY

## 2019-10-31 PROCEDURE — 11102 PR TANGENTIAL BIOPSY, SKIN, SINGLE LESION: ICD-10-PCS | Mod: S$GLB,,, | Performed by: PATHOLOGY

## 2019-10-31 PROCEDURE — 99214 PR OFFICE/OUTPT VISIT, EST, LEVL IV, 30-39 MIN: ICD-10-PCS | Mod: 25,S$GLB,, | Performed by: PATHOLOGY

## 2019-10-31 PROCEDURE — 88305 TISSUE SPECIMEN TO PATHOLOGY, DERMATOLOGY: ICD-10-PCS | Mod: 26,,, | Performed by: PATHOLOGY

## 2019-10-31 PROCEDURE — 99999 PR PBB SHADOW E&M-EST. PATIENT-LVL II: ICD-10-PCS | Mod: PBBFAC,,, | Performed by: PATHOLOGY

## 2019-10-31 PROCEDURE — 88305 TISSUE EXAM BY PATHOLOGIST: CPT | Performed by: PATHOLOGY

## 2019-10-31 PROCEDURE — 99999 PR PBB SHADOW E&M-EST. PATIENT-LVL II: CPT | Mod: PBBFAC,,, | Performed by: PATHOLOGY

## 2019-10-31 RX ORDER — FLUOCINONIDE TOPICAL SOLUTION USP, 0.05% 0.5 MG/ML
SOLUTION TOPICAL
Qty: 60 ML | Refills: 3 | Status: SHIPPED | OUTPATIENT
Start: 2019-10-31 | End: 2020-10-19 | Stop reason: ALTCHOICE

## 2019-10-31 RX ORDER — KETOCONAZOLE 20 MG/ML
SHAMPOO, SUSPENSION TOPICAL
Qty: 120 ML | Refills: 5 | Status: SHIPPED | OUTPATIENT
Start: 2019-10-31 | End: 2020-10-19 | Stop reason: ALTCHOICE

## 2019-10-31 NOTE — PROGRESS NOTES
Subjective:       Patient ID:  Livier Quintana is a 59 y.o. female who presents for   Chief Complaint   Patient presents with    Skin Check     Pt presents today for TBSE     HPI  Pt with no personal h/o skin cancer or dysplastic nevi.  H/o significant sun exposure - recreational fishing.  Has bothersome mole to upper chest that has enlarged slowly over time.  Denies any other new or changing lesions today.    Review of Systems   Constitutional: Negative for fever, chills, weight loss, weight gain, fatigue, night sweats and malaise.   Skin: Negative for daily sunscreen use, activity-related sunscreen use and recent sunburn.   Hematologic/Lymphatic: Does not bruise/bleed easily.        Objective:    Physical Exam   Constitutional: She appears well-developed and well-nourished. No distress.   Neurological: She is alert and oriented to person, place, and time. She is not disoriented.   Psychiatric: She has a normal mood and affect.   Skin:   Areas Examined (abnormalities noted in diagram):   Scalp / Hair Palpated and Inspected  Head / Face Inspection Performed  Neck Inspection Performed  Chest / Axilla Inspection Performed  Abdomen Inspection Performed  Genitals / Buttocks / Groin Inspection Performed  Back Inspection Performed  RUE Inspected  LUE Inspection Performed  RLE Inspected  LLE Inspection Performed  Nails and Digits Inspection Performed                   Diagram Legend     Erythematous scaling macule/papule c/w actinic keratosis       Vascular papule c/w angioma      Pigmented verrucoid papule/plaque c/w seborrheic keratosis      Yellow umbilicated papule c/w sebaceous hyperplasia      Irregularly shaped tan macule c/w lentigo     1-2 mm smooth white papules consistent with Milia      Movable subcutaneous cyst with punctum c/w epidermal inclusion cyst      Subcutaneous movable cyst c/w pilar cyst      Firm pink to brown papule c/w dermatofibroma      Pedunculated fleshy papule(s) c/w skin tag(s)       Evenly pigmented macule c/w junctional nevus     Mildly variegated pigmented, slightly irregular-bordered macule c/w mildly atypical nevus      Flesh colored to evenly pigmented papule c/w intradermal nevus       Pink pearly papule/plaque c/w basal cell carcinoma      Erythematous hyperkeratotic cursted plaque c/w SCC      Surgical scar with no sign of skin cancer recurrence      Open and closed comedones      Inflammatory papules and pustules      Verrucoid papule consistent consistent with wart     Erythematous eczematous patches and plaques     Dystrophic onycholytic nail with subungual debris c/w onychomycosis     Umbilicated papule    Erythematous-base heme-crusted tan verrucoid plaque consistent with inflamed seborrheic keratosis     Erythematous Silvery Scaling Plaque c/w Psoriasis     See annotation      Assessment / Plan:   Rule Out - Biopsy 1: Nevus versus Seborrheic Keratosis versus Other.          Neoplasm of uncertain behavior of skin  -     Tissue Specimen To Pathology, Dermatology    Shave biopsy procedure note:    Shave biopsy performed after verbal consent including risk of infection, scar, recurrence, need for additional treatment of site. Area prepped with alcohol, anesthetized with approximately 1.0cc of 1% lidocaine with epinephrine. Lesional tissue shaved with razor blade. Hemostasis achieved with application of aluminum chloride followed by hyfrecation. No complications. Dressing applied. Wound care explained.        Acne rosacea, erythematous telangiectatic type - pt elects not to treat at this time    Inflamed seborrheic keratosis - left lateral leg.  These are benign inherited growths without a malignant potential. Reassurance given to patient. No treatment is necessary.       SK (seborrheic keratosis) - These are benign inherited growths without a malignant potential. Reassurance given to patient. No treatment is necessary.       Multiple benign nevi - Patient with several benign appearing  nevi. Instructed patient to observe lesion(s) for changes and follow up in clinic if changes are noted.       Solar lentigo - Reassurance given to patient. No treatment is necessary.       Sebaceous hyperplasia of face - This is a common condition representing benign enlargement of the sebaceous lobule. It typically occurs in adulthood. Reassurance given to patient.       Sebopsoriasis  -     ketoconazole (NIZORAL) 2 % shampoo; Wash hair with medicated shampoo at least 2x/week - let sit on scalp at least 5 minutes prior to rinsing  Dispense: 120 mL; Refill: 5  -     fluocinonide (LIDEX) 0.05 % external solution; AAA scalp qday - bid prn pruritus  Dispense: 60 mL; Refill: 3             No follow-ups on file.  Subjective:       Patient ID:  Livier Quintana is a 59 y.o. female who presents for   Chief Complaint   Patient presents with    Skin Check     Pt presents today for TBSE     HPI    Review of Systems   Constitutional: Negative for fever, chills, weight loss, weight gain, fatigue, night sweats and malaise.   Skin: Positive for daily sunscreen use and activity-related sunscreen use. Negative for wears hat.        Objective:    Physical Exam   Constitutional: She appears well-developed and well-nourished. No distress.   Neurological: She is alert and oriented to person, place, and time. She is not disoriented.   Psychiatric: She has a normal mood and affect.   Skin:   Areas Examined (abnormalities noted in diagram):   Scalp / Hair Palpated and Inspected  Head / Face Inspection Performed  Neck Inspection Performed  Chest / Axilla Inspection Performed  Abdomen Inspection Performed  Genitals / Buttocks / Groin Inspection Performed  Back Inspection Performed  RUE Inspected  LUE Inspection Performed  RLE Inspected  LLE Inspection Performed  Nails and Digits Inspection Performed                   Diagram Legend     Erythematous scaling macule/papule c/w actinic keratosis       Vascular papule c/w angioma       Pigmented verrucoid papule/plaque c/w seborrheic keratosis      Yellow umbilicated papule c/w sebaceous hyperplasia      Irregularly shaped tan macule c/w lentigo     1-2 mm smooth white papules consistent with Milia      Movable subcutaneous cyst with punctum c/w epidermal inclusion cyst      Subcutaneous movable cyst c/w pilar cyst      Firm pink to brown papule c/w dermatofibroma      Pedunculated fleshy papule(s) c/w skin tag(s)      Evenly pigmented macule c/w junctional nevus     Mildly variegated pigmented, slightly irregular-bordered macule c/w mildly atypical nevus      Flesh colored to evenly pigmented papule c/w intradermal nevus       Pink pearly papule/plaque c/w basal cell carcinoma      Erythematous hyperkeratotic cursted plaque c/w SCC      Surgical scar with no sign of skin cancer recurrence      Open and closed comedones      Inflammatory papules and pustules      Verrucoid papule consistent consistent with wart     Erythematous eczematous patches and plaques     Dystrophic onycholytic nail with subungual debris c/w onychomycosis     Umbilicated papule    Erythematous-base heme-crusted tan verrucoid plaque consistent with inflamed seborrheic keratosis     Erythematous Silvery Scaling Plaque c/w Psoriasis     See annotation      Assessment / Plan:        There are no diagnoses linked to this encounter.         No follow-ups on file.

## 2020-02-18 ENCOUNTER — PATIENT OUTREACH (OUTPATIENT)
Dept: ADMINISTRATIVE | Facility: OTHER | Age: 60
End: 2020-02-18

## 2020-02-20 ENCOUNTER — OFFICE VISIT (OUTPATIENT)
Dept: OBSTETRICS AND GYNECOLOGY | Facility: CLINIC | Age: 60
End: 2020-02-20
Payer: COMMERCIAL

## 2020-02-20 VITALS
DIASTOLIC BLOOD PRESSURE: 80 MMHG | WEIGHT: 127.63 LBS | BODY MASS INDEX: 23.49 KG/M2 | HEIGHT: 62 IN | SYSTOLIC BLOOD PRESSURE: 122 MMHG

## 2020-02-20 DIAGNOSIS — N39.3 SUI (STRESS URINARY INCONTINENCE, FEMALE): ICD-10-CM

## 2020-02-20 DIAGNOSIS — S20.00XA BRUISE OF BREAST: ICD-10-CM

## 2020-02-20 DIAGNOSIS — Z01.419 ENCOUNTER FOR GYNECOLOGICAL EXAMINATION WITHOUT ABNORMAL FINDING: Primary | ICD-10-CM

## 2020-02-20 LAB
BILIRUB UR QL STRIP: NEGATIVE
CLARITY UR REFRACT.AUTO: CLEAR
COLOR UR AUTO: YELLOW
GLUCOSE UR QL STRIP: NEGATIVE
HGB UR QL STRIP: NEGATIVE
KETONES UR QL STRIP: NEGATIVE
LEUKOCYTE ESTERASE UR QL STRIP: NEGATIVE
NITRITE UR QL STRIP: NEGATIVE
PH UR STRIP: 7 [PH] (ref 5–8)
PROT UR QL STRIP: NEGATIVE
SP GR UR STRIP: 1.01 (ref 1–1.03)
URN SPEC COLLECT METH UR: NORMAL

## 2020-02-20 PROCEDURE — 99396 PR PREVENTIVE VISIT,EST,40-64: ICD-10-PCS | Mod: S$GLB,,, | Performed by: OBSTETRICS & GYNECOLOGY

## 2020-02-20 PROCEDURE — 99396 PREV VISIT EST AGE 40-64: CPT | Mod: S$GLB,,, | Performed by: OBSTETRICS & GYNECOLOGY

## 2020-02-20 PROCEDURE — 3074F SYST BP LT 130 MM HG: CPT | Mod: CPTII,S$GLB,, | Performed by: OBSTETRICS & GYNECOLOGY

## 2020-02-20 PROCEDURE — 81003 URINALYSIS AUTO W/O SCOPE: CPT

## 2020-02-20 PROCEDURE — 88175 CYTOPATH C/V AUTO FLUID REDO: CPT

## 2020-02-20 PROCEDURE — 99999 PR PBB SHADOW E&M-EST. PATIENT-LVL III: ICD-10-PCS | Mod: PBBFAC,,, | Performed by: OBSTETRICS & GYNECOLOGY

## 2020-02-20 PROCEDURE — 87086 URINE CULTURE/COLONY COUNT: CPT

## 2020-02-20 PROCEDURE — 3079F DIAST BP 80-89 MM HG: CPT | Mod: CPTII,S$GLB,, | Performed by: OBSTETRICS & GYNECOLOGY

## 2020-02-20 PROCEDURE — 3079F PR MOST RECENT DIASTOLIC BLOOD PRESSURE 80-89 MM HG: ICD-10-PCS | Mod: CPTII,S$GLB,, | Performed by: OBSTETRICS & GYNECOLOGY

## 2020-02-20 PROCEDURE — 3074F PR MOST RECENT SYSTOLIC BLOOD PRESSURE < 130 MM HG: ICD-10-PCS | Mod: CPTII,S$GLB,, | Performed by: OBSTETRICS & GYNECOLOGY

## 2020-02-20 PROCEDURE — 99999 PR PBB SHADOW E&M-EST. PATIENT-LVL III: CPT | Mod: PBBFAC,,, | Performed by: OBSTETRICS & GYNECOLOGY

## 2020-02-20 NOTE — PROGRESS NOTES
2/20/2020    Specimen UA Urine, Clean Catch   Color, UA Yellow   Appearance, UA Clear   Specific Gravity, UA 1.015   pH, UA 7.0   Protein, UA Negative   Glucose, UA Negative   Ketones, UA Negative   Occult Blood UA Negative   NITRITE UA Negative   Bilirubin (UA) Negative   Leukocytes, UA Negative     Urine Culture, Routine Multiple organisms isolated. None in predominance.  Repeat if    Urine Culture, Routine clinically necessary.        PT HERE FOR ANNUAL.  OCC GA.  BRUISE OVER L CLAVICLE.  WANTS TO PUT LOTION ON LABIA MAJOR.    ROS:  GENERAL: No fever, chills, fatigability or weight loss.  VULVAR: No pain, no lesions and no itching.  VAGINAL: No relaxation, no itching, no discharge, no abnormal bleeding and no lesions.  ABDOMEN: No abdominal pain. Denies nausea. Denies vomiting. No diarrhea. No constipation  BREAST: Denies pain. No lumps. No discharge.  URINARY: No incontinence, no nocturia, no frequency and no dysuria.  CARDIOVASCULAR: No chest pain. No shortness of breath. No leg cramps.  NEUROLOGICAL: No headaches. No vision changes.  The remainder of the review of systems was negative.    PE:  General Appearance: normal weight And Well developed. Well nourished. In no acute distress.  Vulva: Lesions: No.  Urethral Meatus: Normal size. Normal location. No lesions. No prolapse.  Urethra: No masses. No tenderness. No prolapse. No scarring.  Bladder: No masses. No tenderness.  Vagina: Mucosa NI:yes discharge no, atrophy yes, cystocele no or rectocele no.  Cervix: Lesion: no  Stenotic: no Cervical motion tenderness: no  Uterus: Uterus size: 5 weeks. Support good. Uterus size: Normal  Adnexa: Masses: No Tenderness: No CDS Nodularity: No  Abdomen: normal weight No masses. No tenderness.  Breasts: No bilateral masses. No bilateral discharge. No bilateral tenderness. No bilateral fibrocystic changes.  Neck: No thyroid enlargement. No thyroid masses.  Skin: Rashes: No          PROCEDURES:    PLAN:     DIAGNOSIS:  1.  Encounter for gynecological examination without abnormal finding    2. GA (stress urinary incontinence, female)    3. Bruise of breast        MEDICATIONS & ORDERS:  Orders Placed This Encounter    Urine culture    Urinalysis    Liquid-Based Pap Smear, Screening       Patient was counseled today on the new ACS guidelines for cervical cytology screening as well as the current recommendations for breast cancer screening. She was counseled to follow up with her PCP for other routine health maintenance. Counseling session lasted approximately 10 minutes, and all her questions were answered.         FOLLOW-UP: With me in 12 month

## 2020-02-22 LAB
BACTERIA UR CULT: NORMAL
BACTERIA UR CULT: NORMAL

## 2020-03-12 ENCOUNTER — PATIENT OUTREACH (OUTPATIENT)
Dept: ADMINISTRATIVE | Facility: OTHER | Age: 60
End: 2020-03-12

## 2020-03-13 NOTE — PROGRESS NOTES
Care Everywhere: n/a  Immunization: updated  Health Maintenance: updated  Media Review: n/a  Legacy Review: n/a  Order placed: n/a  Upcoming appts:n/a

## 2020-03-23 LAB
FINAL PATHOLOGIC DIAGNOSIS: NORMAL
Lab: NORMAL

## 2020-05-28 ENCOUNTER — PATIENT OUTREACH (OUTPATIENT)
Dept: ADMINISTRATIVE | Facility: OTHER | Age: 60
End: 2020-05-28

## 2020-06-01 ENCOUNTER — OFFICE VISIT (OUTPATIENT)
Dept: OPHTHALMOLOGY | Facility: CLINIC | Age: 60
End: 2020-06-01
Payer: COMMERCIAL

## 2020-06-01 DIAGNOSIS — I10 ESSENTIAL HYPERTENSION: ICD-10-CM

## 2020-06-01 DIAGNOSIS — H52.7 REFRACTIVE ERROR: ICD-10-CM

## 2020-06-01 DIAGNOSIS — H04.123 DRY EYES, BILATERAL: ICD-10-CM

## 2020-06-01 DIAGNOSIS — H25.13 NUCLEAR SCLEROSIS, BILATERAL: Primary | ICD-10-CM

## 2020-06-01 PROCEDURE — 92014 COMPRE OPH EXAM EST PT 1/>: CPT | Mod: S$GLB,,, | Performed by: OPHTHALMOLOGY

## 2020-06-01 PROCEDURE — 99999 PR PBB SHADOW E&M-EST. PATIENT-LVL II: ICD-10-PCS | Mod: PBBFAC,,, | Performed by: OPHTHALMOLOGY

## 2020-06-01 PROCEDURE — 99999 PR PBB SHADOW E&M-EST. PATIENT-LVL II: CPT | Mod: PBBFAC,,, | Performed by: OPHTHALMOLOGY

## 2020-06-01 PROCEDURE — 92014 PR EYE EXAM, EST PATIENT,COMPREHESV: ICD-10-PCS | Mod: S$GLB,,, | Performed by: OPHTHALMOLOGY

## 2020-06-01 NOTE — PROGRESS NOTES
Subjective:       Patient ID: Livier Quintana is a 60 y.o. female.    Chief Complaint: Eye Exam    HPI     DSL- 8/9/2018     61 y/o female is here for routine eye exam. H/o of Nuclear Sclerosis   Bilateral. Pt present with a c/o of blurry vision. Pt has dry eyes. Denies   glare, floaters and flashes. Pt states she sees better at night w/o her   glasses. No other ocular complaints.   Eyemeds  Refresh OU BID    Last edited by Nabila Amaya on 6/1/2020 10:41 AM. (History)             Assessment:       1. Nuclear sclerosis, bilateral    2. Dry eyes, bilateral    3. Essential hypertension    4. Refractive error        Plan:       Cataracts- Not visually significant.  SKYE-Needs more AT's.  HTN-No retinopathy OU.  RE-Pt wants MRx.         AT's.  Control HTN.  Give MRx.  RTC 1 yr.

## 2020-07-14 NOTE — PATIENT INSTRUCTIONS
FOR NOW STAY OFF THE AMLODIPINE.  CHECK BP - IF IT GOES ABOVE 140/90 AND STAYS HIGH - CALL - RESTART AMLODIPINE.  
no acute ST-T wave changes

## 2020-07-24 ENCOUNTER — TELEPHONE (OUTPATIENT)
Dept: FAMILY MEDICINE | Facility: CLINIC | Age: 60
End: 2020-07-24

## 2020-07-24 NOTE — TELEPHONE ENCOUNTER
Spoke with the patient and she knows someone who has tested positive for Covid a week ago.  She came in contact with the person last week.  She feels fine and wants to know should she just isolate herself for 10 days?  Please advise                  ----- Message from Mya Willingham sent at 7/24/2020  2:06 PM CDT -----  Patient is at risk for COVID-19 (Coronavirus).  Patient is not presenting any symptoms.      Patient came in contact with someone who tested positive for covid 19. She does not have any symptoms but would like to know what she should do. Please call       Call back Number : .[phone] (home)

## 2020-07-24 NOTE — TELEPHONE ENCOUNTER
Yes I think that would be reasonable.  Call if she develops symptoms.  Spoke with the patient and told her to isolate for 10 days and to call the office back is she develop symptoms.  Patient verbalized understandings.

## 2020-09-11 DIAGNOSIS — I10 ESSENTIAL HYPERTENSION: ICD-10-CM

## 2020-10-02 ENCOUNTER — TELEPHONE (OUTPATIENT)
Dept: FAMILY MEDICINE | Facility: CLINIC | Age: 60
End: 2020-10-02

## 2020-10-02 NOTE — TELEPHONE ENCOUNTER
----- Message from Lynette Kyra sent at 10/2/2020  1:33 PM CDT -----  Regarding: self [phone]  .Type: RX Refill Request    Who Called:  self     Have you contacted your pharmacy: no    Refill or New Rx: refill     RX Name and Strength: amLODIPine (NORVASC) 2.5 MG tablet    Is this a 30 day or 90 day RX:  30     Preferred Pharmacy with phone number: .  [id number] - [place] - [address]  [address] [place] [address]  Phone: [phone] Fax: [phone]    Local or Mail Order: local       Would the patient rather a call back or a response via My [facility]?  Call back     Best Call Back Number: [phone]

## 2020-10-05 ENCOUNTER — PATIENT MESSAGE (OUTPATIENT)
Dept: ADMINISTRATIVE | Facility: HOSPITAL | Age: 60
End: 2020-10-05

## 2020-10-12 ENCOUNTER — TELEPHONE (OUTPATIENT)
Dept: FAMILY MEDICINE | Facility: CLINIC | Age: 60
End: 2020-10-12

## 2020-10-12 DIAGNOSIS — Z12.31 BREAST CANCER SCREENING BY MAMMOGRAM: Primary | ICD-10-CM

## 2020-10-12 NOTE — TELEPHONE ENCOUNTER
----- Message from Jill Sabillon sent at 10/12/2020  9:49 AM CDT -----  Regarding: Orders  Type:mammo         Caller is requesting to schedule their mammo  appointment prior to annual appointment.    Order is not listed in EPIC.  Please enter order and contact patient to schedule.         Name of Caller: JOEL ESPINOZA [[id number]]         Preferred Date and Time of Labs: as soon as possible          Date of EPP Appointment: N/ a         Where would they like the lab performed? [facility]          Would the patient rather a call back or a response via My [facility]?          Best Call Back Number: [phone]         Additional Information:

## 2020-10-13 ENCOUNTER — LAB VISIT (OUTPATIENT)
Dept: LAB | Facility: HOSPITAL | Age: 60
End: 2020-10-13
Attending: INTERNAL MEDICINE
Payer: COMMERCIAL

## 2020-10-13 DIAGNOSIS — I10 ESSENTIAL HYPERTENSION: Chronic | ICD-10-CM

## 2020-10-13 DIAGNOSIS — Z11.4 ENCOUNTER FOR SCREENING FOR HIV: ICD-10-CM

## 2020-10-13 LAB
ALBUMIN SERPL BCP-MCNC: 4 G/DL (ref 3.5–5.2)
ALP SERPL-CCNC: 87 U/L (ref 55–135)
ALT SERPL W/O P-5'-P-CCNC: 12 U/L (ref 10–44)
ANION GAP SERPL CALC-SCNC: 8 MMOL/L (ref 8–16)
AST SERPL-CCNC: 20 U/L (ref 10–40)
BILIRUB SERPL-MCNC: 0.6 MG/DL (ref 0.1–1)
BUN SERPL-MCNC: 15 MG/DL (ref 6–20)
CALCIUM SERPL-MCNC: 8.9 MG/DL (ref 8.7–10.5)
CHLORIDE SERPL-SCNC: 108 MMOL/L (ref 95–110)
CHOLEST SERPL-MCNC: 208 MG/DL (ref 120–199)
CHOLEST/HDLC SERPL: 3.1 {RATIO} (ref 2–5)
CO2 SERPL-SCNC: 26 MMOL/L (ref 23–29)
CREAT SERPL-MCNC: 0.7 MG/DL (ref 0.5–1.4)
EST. GFR  (AFRICAN AMERICAN): >60 ML/MIN/1.73 M^2
EST. GFR  (NON AFRICAN AMERICAN): >60 ML/MIN/1.73 M^2
GLUCOSE SERPL-MCNC: 84 MG/DL (ref 70–110)
HDLC SERPL-MCNC: 67 MG/DL (ref 40–75)
HDLC SERPL: 32.2 % (ref 20–50)
LDLC SERPL CALC-MCNC: 120 MG/DL (ref 63–159)
NONHDLC SERPL-MCNC: 141 MG/DL
POTASSIUM SERPL-SCNC: 3.9 MMOL/L (ref 3.5–5.1)
PROT SERPL-MCNC: 7.1 G/DL (ref 6–8.4)
SODIUM SERPL-SCNC: 142 MMOL/L (ref 136–145)
TRIGL SERPL-MCNC: 105 MG/DL (ref 30–150)

## 2020-10-13 PROCEDURE — 86703 HIV-1/HIV-2 1 RESULT ANTBDY: CPT

## 2020-10-13 PROCEDURE — 80061 LIPID PANEL: CPT

## 2020-10-13 PROCEDURE — 36415 COLL VENOUS BLD VENIPUNCTURE: CPT | Mod: PO

## 2020-10-13 PROCEDURE — 80053 COMPREHEN METABOLIC PANEL: CPT

## 2020-10-14 LAB — HIV 1+2 AB+HIV1 P24 AG SERPL QL IA: NEGATIVE

## 2020-10-17 NOTE — PROGRESS NOTES
This note was created by combination of typed  and M-Modal dictation.  Transcription errors may be present.  If there are any questions, please contact me.    Assessment and Plan:   Normal physical exam  Screening for colon cancer 2015 normal repeat 10 years  -pre visit labs reviewed  Colonoscopy due 2025    Essential hypertension  -stable.  Refilled amlodipine low-dose.  10 year ASCVD < 5% no indication for statin  -     amLODIPine (NORVASC) 2.5 MG tablet; Take 1 tablet (2.5 mg total) by mouth once daily.  Dispense: 90 tablet; Refill: 3    Age-related osteoporosis without current pathological fracture on DEXA 8/2018  -has not been taking her Fosamax for the past 6 months she thinks.  Had no side effects of it and is agreeable to restart.  Re-sent to pharmacy.  Plan to repeat bone density scan in a year.  Hold on repeat this year.  -     alendronate (FOSAMAX) 70 MG tablet; Take 1 tablet (70 mg total) by mouth every 7 days.  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    Anxiety, xanax for sleep mainly. melatonin with SE  -rare use of Xanax.  Refilled today.  Thirty tablets typically last her 1 year.  -     ALPRAZolam (XANAX) 0.25 MG tablet; Take 1 tablet (0.25 mg total) by mouth nightly as needed for Anxiety.  Dispense: 30 tablet; Refill: 0    Needs flu shot  -     Influenza - Quadrivalent *Preferred* (6 months+) (PF)    Need for vaccination for Strep pneumoniae  -she would like to get the flu shot today and wait a week or 2 before getting her pneumonia shot.  -     (In Office Administered) Pneumococcal Polysaccharide Vaccine (23 Valent) (SQ/IM); Future; Expected date: 10/26/2020    Medications Discontinued During This Encounter   Medication Reason    alendronate (FOSAMAX) 70 MG tablet Reorder    amLODIPine (NORVASC) 2.5 MG tablet Reorder    ALPRAZolam (XANAX) 0.25 MG tablet Reorder    hydrOXYzine  HCl (ATARAX) 10 MG Tab Therapy not effective    ketoconazole (NIZORAL) 2 % shampoo Therapy completed    fluocinonide (LIDEX) 0.05 % external solution Therapy completed       meds sent this encounter:  Medications Ordered This Encounter   Medications    alendronate (FOSAMAX) 70 MG tablet     Sig: Take 1 tablet (70 mg total) by mouth every 7 days.     Dispense:  4 tablet     Refill:  11    ALPRAZolam (XANAX) 0.25 MG tablet     Sig: Take 1 tablet (0.25 mg total) by mouth nightly as needed for Anxiety.     Dispense:  30 tablet     Refill:  0    amLODIPine (NORVASC) 2.5 MG tablet     Sig: Take 1 tablet (2.5 mg total) by mouth once daily.     Dispense:  90 tablet     Refill:  3     .       Follow Up: No follow-ups on file.  Physical exam 1 year.  Pre visit labs ordered.      Subjective:     Chief Complaint   Patient presents with    Annual Exam       HPI  Livier is a 60 y.o. female, last appointment with this clinic was Visit date not found.    Patient's last menstrual period was 09/01/2012.    Last seen last year for physical.  Anxiety insomnia rare use of Xanax.  Hypertension.  Stable at that time.  Osteoporosis on alendronate    Pre visit labs CMP normal  Lipid profile okay    She had called 7/2020 with exposure to covid positive individual.  Had URI sx. Resolved.  Mild symptoms.  She is agreeable not to test for antibodies.    fam hx of pulm fibrosis.  History of abnormal PFTs personally, questionable whether this was effort related.  No symptoms.  Not using any sort of inhaler.  No cough no dyspnea.     Home readings 110 systolic.  BP is normal on repeat.    Has not been taking the fosamax. For the past 6 months. Did not have SE when she was taking it.  Her sister and mother take the shot.     Hydroxyzine ineffective.  Was originally prescribed for sleep I believe.    Not using topical nizoral or lidex.     The 10-year ASCVD risk score (Angelica ELMO Jr., et al., 2013) is: 2.1%    Values used to calculate the score:       Age: 60 years      Sex: Female      Is Non- : No      Diabetic: No      Tobacco smoker: No      Systolic Blood Pressure: 104 mmHg      Is BP treated: Yes      HDL Cholesterol: 67 mg/dL      Total Cholesterol: 208 mg/dL      Patient Care Team:  Juan Jose Guallpa MD as PCP - General (Internal Medicine)  Chucky Lujan Jr., MD as Obstetrician (Obstetrics)  Humble Milton MA as Care Coordinator    Patient Active Problem List    Diagnosis Date Noted    Mixed restrictive and obstructive lung disease on PFTs 2018 felt to be due to effort; never smoker 2018    Age-related osteoporosis without current pathological fracture on DEXA 2018    Vitamin D deficiency 2018    Early menopause 2018    Anxiety, xanax for sleep mainly. melatonin with SE 2017    Family history of breast cancer in sister 2016    Essential hypertension 10/05/2015    Dry eyes, bilateral 10/05/2015    Screening for colon cancer 2015 normal repeat 10 years 2015     3/9/2015 colonoscopy normal repeat 10 years      Refractive error 2014    Nuclear sclerosis, bilateral 2012       PAST MEDICAL HISTORY:  Past Medical History:   Diagnosis Date    Amblyopia     Cataract     Family history of breast cancer     SISTER WITH RECURRENCE    Family history of pulmonary fibrosis     Fissure, anal     inner     Hypertension     Nuclear sclerosis - Both Eyes 2012    Strabismus        PAST SURGICAL HISTORY:  Past Surgical History:   Procedure Laterality Date     SECTION, CLASSIC      x3    ENDOMETRIAL ABLATION      KJB---D&C HSCOPE/HTA    STRABISMUS SURGERY      X 2       SOCIAL HISTORY:  Social History     Socioeconomic History    Marital status:      Spouse name: Not on file    Number of children: Not on file    Years of education: Not on file    Highest education level: Not on file   Occupational History    Not on file   Social  Needs    Financial resource strain: Not on file    Food insecurity     Worry: Not on file     Inability: Not on file    Transportation needs     Medical: Not on file     Non-medical: Not on file   Tobacco Use    Smoking status: Never Smoker    Smokeless tobacco: Never Used   Substance and Sexual Activity    Alcohol use: No     Comment: socially    Drug use: No    Sexual activity: Not Currently     Partners: Male   Lifestyle    Physical activity     Days per week: Not on file     Minutes per session: Not on file    Stress: Not at all   Relationships    Social connections     Talks on phone: Not on file     Gets together: Not on file     Attends Orthodox service: Not on file     Active member of club or organization: Not on file     Attends meetings of clubs or organizations: Not on file     Relationship status: Not on file   Other Topics Concern    Are you pregnant or think you may be? Not Asked    Breast-feeding Not Asked   Social History Narrative    Not on file        ALLERGIES AND MEDICATIONS: updated and reviewed.  Review of patient's allergies indicates:  No Known Allergies    Medication List with Changes/Refills   Current Medications    ALENDRONATE (FOSAMAX) 70 MG TABLET    TAKE ONE TABLET BY MOUTH EVERY 7 DAYS    ALPRAZOLAM (XANAX) 0.25 MG TABLET    Take 1 tablet (0.25 mg total) by mouth nightly as needed for Anxiety.    AMLODIPINE (NORVASC) 2.5 MG TABLET    TAKE ONE TABLET BY MOUTH DAILY    FLUOCINONIDE (LIDEX) 0.05 % EXTERNAL SOLUTION    AAA scalp qday - bid prn pruritus    HYDROXYZINE HCL (ATARAX) 10 MG TAB    Take 1 tablet (10 mg total) by mouth nightly as needed.    KETOCONAZOLE (NIZORAL) 2 % SHAMPOO    Wash hair with medicated shampoo at least 2x/week - let sit on scalp at least 5 minutes prior to rinsing    NAPROXEN (NAPROSYN) 500 MG TABLET    Take 1 tablet (500 mg total) by mouth 2 (two) times daily.       Review of Systems   Constitutional: Negative for fever, malaise/fatigue and  "weight loss.   HENT: Negative for congestion.    Eyes: Negative for blurred vision and pain.   Respiratory: Negative for shortness of breath and wheezing.    Cardiovascular: Negative for chest pain, palpitations and leg swelling.   Gastrointestinal: Negative for abdominal pain, blood in stool, constipation, diarrhea and heartburn.   Genitourinary: Negative for dysuria, hematuria and urgency.   Neurological: Negative for tingling, focal weakness, weakness and headaches.       Objective:   Physical Exam   Vitals:    10/19/20 1056 10/19/20 1135   BP: 98/78 104/76   BP Location: Right arm Left arm   Patient Position: Sitting Sitting   BP Method: Medium (Manual) Medium (Manual)   Pulse: 78    Temp: 97.7 °F (36.5 °C)    TempSrc: Temporal    SpO2: 97%    Weight: 57.6 kg (127 lb)    Height: 5' 2" (1.575 m)     Body mass index is 23.23 kg/m².            Physical Exam  Constitutional:       Appearance: She is well-developed.   HENT:      Right Ear: Tympanic membrane, ear canal and external ear normal.      Left Ear: Tympanic membrane, ear canal and external ear normal.   Eyes:      General: No scleral icterus.     Pupils: Pupils are equal, round, and reactive to light.   Neck:      Musculoskeletal: Neck supple.      Thyroid: No thyromegaly.   Cardiovascular:      Rate and Rhythm: Normal rate and regular rhythm.      Heart sounds: Normal heart sounds. No murmur.   Pulmonary:      Effort: Pulmonary effort is normal.      Breath sounds: Normal breath sounds. No wheezing.   Abdominal:      Palpations: Abdomen is soft. There is no hepatomegaly, splenomegaly or mass.      Tenderness: There is no abdominal tenderness.   Musculoskeletal: Normal range of motion.         General: No deformity.   Lymphadenopathy:      Cervical: No cervical adenopathy.   Skin:     General: Skin is warm and dry.      Findings: No rash.      Comments: On exposed skin   Neurological:      Mental Status: She is alert and oriented to person, place, and time. "      Deep Tendon Reflexes: Reflexes are normal and symmetric.   Psychiatric:         Behavior: Behavior normal.         Thought Content: Thought content normal.         Judgment: Judgment normal.         Component      Latest Ref Rng & Units 10/13/2020   Sodium      136 - 145 mmol/L 142   Potassium      3.5 - 5.1 mmol/L 3.9   Chloride      95 - 110 mmol/L 108   CO2      23 - 29 mmol/L 26   Glucose      70 - 110 mg/dL 84   BUN, Bld      6 - 20 mg/dL 15   Creatinine      0.5 - 1.4 mg/dL 0.7   Calcium      8.7 - 10.5 mg/dL 8.9   PROTEIN TOTAL      6.0 - 8.4 g/dL 7.1   Albumin      3.5 - 5.2 g/dL 4.0   BILIRUBIN TOTAL      0.1 - 1.0 mg/dL 0.6   Alkaline Phosphatase      55 - 135 U/L 87   AST      10 - 40 U/L 20   ALT      10 - 44 U/L 12   Anion Gap      8 - 16 mmol/L 8   eGFR if African American      >60 mL/min/1.73 m:2 >60.0   eGFR if non African American      >60 mL/min/1.73 m:2 >60.0   Cholesterol      120 - 199 mg/dL 208 (H)   Triglycerides      30 - 150 mg/dL 105   HDL      40 - 75 mg/dL 67   LDL Cholesterol External      63.0 - 159.0 mg/dL 120.0   Hdl/Cholesterol Ratio      20.0 - 50.0 % 32.2   Total Cholesterol/HDL Ratio      2.0 - 5.0 3.1   Non-HDL Cholesterol      mg/dL 141   HIV 1/2 Ag/Ab      Negative Negative

## 2020-10-19 ENCOUNTER — OFFICE VISIT (OUTPATIENT)
Dept: FAMILY MEDICINE | Facility: CLINIC | Age: 60
End: 2020-10-19
Payer: COMMERCIAL

## 2020-10-19 VITALS
WEIGHT: 127 LBS | OXYGEN SATURATION: 97 % | HEIGHT: 62 IN | TEMPERATURE: 98 F | BODY MASS INDEX: 23.37 KG/M2 | SYSTOLIC BLOOD PRESSURE: 104 MMHG | HEART RATE: 78 BPM | DIASTOLIC BLOOD PRESSURE: 76 MMHG

## 2020-10-19 DIAGNOSIS — J98.4 MIXED RESTRICTIVE AND OBSTRUCTIVE LUNG DISEASE: ICD-10-CM

## 2020-10-19 DIAGNOSIS — I10 ESSENTIAL HYPERTENSION: Chronic | ICD-10-CM

## 2020-10-19 DIAGNOSIS — Z23 NEEDS FLU SHOT: ICD-10-CM

## 2020-10-19 DIAGNOSIS — Z00.00 NORMAL PHYSICAL EXAM: Primary | ICD-10-CM

## 2020-10-19 DIAGNOSIS — F41.9 ANXIETY: Chronic | ICD-10-CM

## 2020-10-19 DIAGNOSIS — Z23 NEED FOR VACCINATION FOR STREP PNEUMONIAE: ICD-10-CM

## 2020-10-19 DIAGNOSIS — J43.9 MIXED RESTRICTIVE AND OBSTRUCTIVE LUNG DISEASE: ICD-10-CM

## 2020-10-19 DIAGNOSIS — M81.0 AGE-RELATED OSTEOPOROSIS WITHOUT CURRENT PATHOLOGICAL FRACTURE: ICD-10-CM

## 2020-10-19 DIAGNOSIS — Z12.11 SCREENING FOR COLON CANCER: ICD-10-CM

## 2020-10-19 PROCEDURE — 90686 FLU VACCINE (QUAD) GREATER THAN OR EQUAL TO 3YO PRESERVATIVE FREE IM: ICD-10-PCS | Mod: S$GLB,,, | Performed by: INTERNAL MEDICINE

## 2020-10-19 PROCEDURE — 90471 IMMUNIZATION ADMIN: CPT | Mod: S$GLB,,, | Performed by: INTERNAL MEDICINE

## 2020-10-19 PROCEDURE — 99396 PREV VISIT EST AGE 40-64: CPT | Mod: 25,S$GLB,, | Performed by: INTERNAL MEDICINE

## 2020-10-19 PROCEDURE — 90686 IIV4 VACC NO PRSV 0.5 ML IM: CPT | Mod: S$GLB,,, | Performed by: INTERNAL MEDICINE

## 2020-10-19 PROCEDURE — 99999 PR PBB SHADOW E&M-EST. PATIENT-LVL IV: CPT | Mod: PBBFAC,,, | Performed by: INTERNAL MEDICINE

## 2020-10-19 PROCEDURE — 99999 PR PBB SHADOW E&M-EST. PATIENT-LVL IV: ICD-10-PCS | Mod: PBBFAC,,, | Performed by: INTERNAL MEDICINE

## 2020-10-19 PROCEDURE — 3074F SYST BP LT 130 MM HG: CPT | Mod: CPTII,S$GLB,, | Performed by: INTERNAL MEDICINE

## 2020-10-19 PROCEDURE — 3078F PR MOST RECENT DIASTOLIC BLOOD PRESSURE < 80 MM HG: ICD-10-PCS | Mod: CPTII,S$GLB,, | Performed by: INTERNAL MEDICINE

## 2020-10-19 PROCEDURE — 3008F PR BODY MASS INDEX (BMI) DOCUMENTED: ICD-10-PCS | Mod: CPTII,S$GLB,, | Performed by: INTERNAL MEDICINE

## 2020-10-19 PROCEDURE — 3074F PR MOST RECENT SYSTOLIC BLOOD PRESSURE < 130 MM HG: ICD-10-PCS | Mod: CPTII,S$GLB,, | Performed by: INTERNAL MEDICINE

## 2020-10-19 PROCEDURE — 99396 PR PREVENTIVE VISIT,EST,40-64: ICD-10-PCS | Mod: 25,S$GLB,, | Performed by: INTERNAL MEDICINE

## 2020-10-19 PROCEDURE — 3078F DIAST BP <80 MM HG: CPT | Mod: CPTII,S$GLB,, | Performed by: INTERNAL MEDICINE

## 2020-10-19 PROCEDURE — 3008F BODY MASS INDEX DOCD: CPT | Mod: CPTII,S$GLB,, | Performed by: INTERNAL MEDICINE

## 2020-10-19 PROCEDURE — 90471 FLU VACCINE (QUAD) GREATER THAN OR EQUAL TO 3YO PRESERVATIVE FREE IM: ICD-10-PCS | Mod: S$GLB,,, | Performed by: INTERNAL MEDICINE

## 2020-10-19 RX ORDER — ALPRAZOLAM 0.25 MG/1
0.25 TABLET ORAL NIGHTLY PRN
Qty: 30 TABLET | Refills: 0 | Status: SHIPPED | OUTPATIENT
Start: 2020-10-19 | End: 2023-10-11

## 2020-10-19 RX ORDER — ALENDRONATE SODIUM 70 MG/1
70 TABLET ORAL
Qty: 4 TABLET | Refills: 11 | Status: SHIPPED | OUTPATIENT
Start: 2020-10-19 | End: 2023-10-11 | Stop reason: SDUPTHER

## 2020-10-19 RX ORDER — AMLODIPINE BESYLATE 2.5 MG/1
2.5 TABLET ORAL DAILY
Qty: 90 TABLET | Refills: 3 | Status: SHIPPED | OUTPATIENT
Start: 2020-10-19 | End: 2023-10-11

## 2020-10-19 NOTE — PATIENT INSTRUCTIONS
The 10-year ASCVD risk score (Angelica BORREGO Jr., et al., 2013) is: 2.1%    Values used to calculate the score:      Age: 60 years      Sex: Female      Is Non- : No      Diabetic: No      Tobacco smoker: No      Systolic Blood Pressure: 104 mmHg      Is BP treated: Yes      HDL Cholesterol: 67 mg/dL      Total Cholesterol: 208 mg/dL

## 2020-10-20 ENCOUNTER — HOSPITAL ENCOUNTER (OUTPATIENT)
Dept: RADIOLOGY | Facility: HOSPITAL | Age: 60
Discharge: HOME | End: 2020-10-20
Attending: INTERNAL MEDICINE
Payer: COMMERCIAL

## 2020-10-20 DIAGNOSIS — Z12.31 BREAST CANCER SCREENING BY MAMMOGRAM: ICD-10-CM

## 2020-10-20 PROCEDURE — 77067 SCR MAMMO BI INCL CAD: CPT | Mod: 26,,, | Performed by: RADIOLOGY

## 2020-10-20 PROCEDURE — 77063 BREAST TOMOSYNTHESIS BI: CPT | Mod: 26,,, | Performed by: RADIOLOGY

## 2020-10-20 PROCEDURE — 77067 MAMMO DIGITAL SCREENING BILAT WITH TOMO: ICD-10-PCS | Mod: 26,,, | Performed by: RADIOLOGY

## 2020-10-20 PROCEDURE — 77067 SCR MAMMO BI INCL CAD: CPT | Mod: TC

## 2020-10-20 PROCEDURE — 77063 MAMMO DIGITAL SCREENING BILAT WITH TOMO: ICD-10-PCS | Mod: 26,,, | Performed by: RADIOLOGY

## 2021-03-31 ENCOUNTER — IMMUNIZATION (OUTPATIENT)
Dept: PRIMARY CARE CLINIC | Facility: CLINIC | Age: 61
End: 2021-03-31
Payer: COMMERCIAL

## 2021-03-31 DIAGNOSIS — Z23 NEED FOR VACCINATION: Primary | ICD-10-CM

## 2021-03-31 PROCEDURE — 91303 PR SARSCOV2 VAC AD26 .5ML IM: CPT | Mod: S$GLB,,, | Performed by: INTERNAL MEDICINE

## 2021-03-31 PROCEDURE — 0031A PR IMMUNIZ ADMIN, SARS-COV-2 COVID-19 VACC, 5X10VP/0.5ML: ICD-10-PCS | Mod: CV19,S$GLB,, | Performed by: INTERNAL MEDICINE

## 2021-03-31 PROCEDURE — 0031A PR IMMUNIZ ADMIN, SARS-COV-2 COVID-19 VACC, 5X10VP/0.5ML: CPT | Mod: CV19,S$GLB,, | Performed by: INTERNAL MEDICINE

## 2021-03-31 PROCEDURE — 91303 PR SARSCOV2 VAC AD26 .5ML IM: ICD-10-PCS | Mod: S$GLB,,, | Performed by: INTERNAL MEDICINE

## 2021-04-14 ENCOUNTER — TELEPHONE (OUTPATIENT)
Dept: FAMILY MEDICINE | Facility: CLINIC | Age: 61
End: 2021-04-14

## 2021-10-28 DIAGNOSIS — Z12.31 OTHER SCREENING MAMMOGRAM: ICD-10-CM

## 2021-12-08 ENCOUNTER — PATIENT MESSAGE (OUTPATIENT)
Dept: ADMINISTRATIVE | Facility: HOSPITAL | Age: 61
End: 2021-12-08
Payer: COMMERCIAL

## 2022-03-16 DIAGNOSIS — I10 ESSENTIAL HYPERTENSION: ICD-10-CM

## 2022-05-31 ENCOUNTER — PATIENT MESSAGE (OUTPATIENT)
Dept: ADMINISTRATIVE | Facility: HOSPITAL | Age: 62
End: 2022-05-31
Payer: COMMERCIAL

## 2022-09-15 ENCOUNTER — PATIENT OUTREACH (OUTPATIENT)
Dept: ADMINISTRATIVE | Facility: HOSPITAL | Age: 62
End: 2022-09-15
Payer: COMMERCIAL

## 2022-10-11 ENCOUNTER — PATIENT MESSAGE (OUTPATIENT)
Dept: ADMINISTRATIVE | Facility: HOSPITAL | Age: 62
End: 2022-10-11
Payer: COMMERCIAL

## 2023-06-09 ENCOUNTER — PATIENT OUTREACH (OUTPATIENT)
Dept: ADMINISTRATIVE | Facility: HOSPITAL | Age: 63
End: 2023-06-09
Payer: COMMERCIAL

## 2023-10-11 ENCOUNTER — LAB VISIT (OUTPATIENT)
Dept: LAB | Facility: HOSPITAL | Age: 63
End: 2023-10-11
Attending: FAMILY MEDICINE
Payer: COMMERCIAL

## 2023-10-11 ENCOUNTER — OFFICE VISIT (OUTPATIENT)
Dept: FAMILY MEDICINE | Facility: CLINIC | Age: 63
End: 2023-10-11
Payer: COMMERCIAL

## 2023-10-11 VITALS
WEIGHT: 132.25 LBS | DIASTOLIC BLOOD PRESSURE: 88 MMHG | BODY MASS INDEX: 24.34 KG/M2 | HEIGHT: 62 IN | SYSTOLIC BLOOD PRESSURE: 122 MMHG | HEART RATE: 70 BPM | TEMPERATURE: 98 F | OXYGEN SATURATION: 96 %

## 2023-10-11 DIAGNOSIS — M81.0 AGE-RELATED OSTEOPOROSIS WITHOUT CURRENT PATHOLOGICAL FRACTURE: ICD-10-CM

## 2023-10-11 DIAGNOSIS — I10 ESSENTIAL HYPERTENSION: Chronic | ICD-10-CM

## 2023-10-11 DIAGNOSIS — F41.9 ANXIETY: Chronic | ICD-10-CM

## 2023-10-11 DIAGNOSIS — E55.9 VITAMIN D DEFICIENCY: ICD-10-CM

## 2023-10-11 DIAGNOSIS — Z00.00 WELL ADULT EXAM: ICD-10-CM

## 2023-10-11 DIAGNOSIS — R06.02 SHORTNESS OF BREATH: ICD-10-CM

## 2023-10-11 DIAGNOSIS — Z12.31 ENCOUNTER FOR SCREENING MAMMOGRAM FOR BREAST CANCER: ICD-10-CM

## 2023-10-11 DIAGNOSIS — I10 ESSENTIAL HYPERTENSION: ICD-10-CM

## 2023-10-11 DIAGNOSIS — Z00.00 WELL ADULT EXAM: Primary | ICD-10-CM

## 2023-10-11 LAB
25(OH)D3+25(OH)D2 SERPL-MCNC: 36 NG/ML (ref 30–96)
ALBUMIN SERPL BCP-MCNC: 4.2 G/DL (ref 3.5–5.2)
ALP SERPL-CCNC: 91 U/L (ref 55–135)
ALT SERPL W/O P-5'-P-CCNC: 16 U/L (ref 10–44)
ANION GAP SERPL CALC-SCNC: 9 MMOL/L (ref 8–16)
AST SERPL-CCNC: 23 U/L (ref 10–40)
BILIRUB SERPL-MCNC: 0.8 MG/DL (ref 0.1–1)
BUN SERPL-MCNC: 16 MG/DL (ref 8–23)
CALCIUM SERPL-MCNC: 9.5 MG/DL (ref 8.7–10.5)
CHLORIDE SERPL-SCNC: 106 MMOL/L (ref 95–110)
CHOLEST SERPL-MCNC: 250 MG/DL (ref 120–199)
CHOLEST/HDLC SERPL: 4.2 {RATIO} (ref 2–5)
CO2 SERPL-SCNC: 23 MMOL/L (ref 23–29)
CREAT SERPL-MCNC: 0.7 MG/DL (ref 0.5–1.4)
EST. GFR  (NO RACE VARIABLE): >60 ML/MIN/1.73 M^2
ESTIMATED AVG GLUCOSE: 105 MG/DL (ref 68–131)
GLUCOSE SERPL-MCNC: 85 MG/DL (ref 70–110)
HBA1C MFR BLD: 5.3 % (ref 4–5.6)
HDLC SERPL-MCNC: 60 MG/DL (ref 40–75)
HDLC SERPL: 24 % (ref 20–50)
LDLC SERPL CALC-MCNC: 169.6 MG/DL (ref 63–159)
NONHDLC SERPL-MCNC: 190 MG/DL
POTASSIUM SERPL-SCNC: 4.3 MMOL/L (ref 3.5–5.1)
PROT SERPL-MCNC: 7.8 G/DL (ref 6–8.4)
SODIUM SERPL-SCNC: 138 MMOL/L (ref 136–145)
TRIGL SERPL-MCNC: 102 MG/DL (ref 30–150)
TSH SERPL DL<=0.005 MIU/L-ACNC: 1.1 UIU/ML (ref 0.4–4)

## 2023-10-11 PROCEDURE — 3074F SYST BP LT 130 MM HG: CPT | Mod: CPTII,S$GLB,, | Performed by: FAMILY MEDICINE

## 2023-10-11 PROCEDURE — 3079F PR MOST RECENT DIASTOLIC BLOOD PRESSURE 80-89 MM HG: ICD-10-PCS | Mod: CPTII,S$GLB,, | Performed by: FAMILY MEDICINE

## 2023-10-11 PROCEDURE — 90686 IIV4 VACC NO PRSV 0.5 ML IM: CPT | Mod: S$GLB,,, | Performed by: FAMILY MEDICINE

## 2023-10-11 PROCEDURE — 99999 PR PBB SHADOW E&M-EST. PATIENT-LVL IV: CPT | Mod: PBBFAC,,, | Performed by: FAMILY MEDICINE

## 2023-10-11 PROCEDURE — 3074F PR MOST RECENT SYSTOLIC BLOOD PRESSURE < 130 MM HG: ICD-10-PCS | Mod: CPTII,S$GLB,, | Performed by: FAMILY MEDICINE

## 2023-10-11 PROCEDURE — 80061 LIPID PANEL: CPT | Performed by: FAMILY MEDICINE

## 2023-10-11 PROCEDURE — 1159F PR MEDICATION LIST DOCUMENTED IN MEDICAL RECORD: ICD-10-PCS | Mod: CPTII,S$GLB,, | Performed by: FAMILY MEDICINE

## 2023-10-11 PROCEDURE — 90471 FLU VACCINE (QUAD) GREATER THAN OR EQUAL TO 3YO PRESERVATIVE FREE IM: ICD-10-PCS | Mod: S$GLB,,, | Performed by: FAMILY MEDICINE

## 2023-10-11 PROCEDURE — 36415 COLL VENOUS BLD VENIPUNCTURE: CPT | Mod: PO | Performed by: FAMILY MEDICINE

## 2023-10-11 PROCEDURE — 1160F RVW MEDS BY RX/DR IN RCRD: CPT | Mod: CPTII,S$GLB,, | Performed by: FAMILY MEDICINE

## 2023-10-11 PROCEDURE — 90686 FLU VACCINE (QUAD) GREATER THAN OR EQUAL TO 3YO PRESERVATIVE FREE IM: ICD-10-PCS | Mod: S$GLB,,, | Performed by: FAMILY MEDICINE

## 2023-10-11 PROCEDURE — 1160F PR REVIEW ALL MEDS BY PRESCRIBER/CLIN PHARMACIST DOCUMENTED: ICD-10-PCS | Mod: CPTII,S$GLB,, | Performed by: FAMILY MEDICINE

## 2023-10-11 PROCEDURE — 99999 PR PBB SHADOW E&M-EST. PATIENT-LVL IV: ICD-10-PCS | Mod: PBBFAC,,, | Performed by: FAMILY MEDICINE

## 2023-10-11 PROCEDURE — 3008F PR BODY MASS INDEX (BMI) DOCUMENTED: ICD-10-PCS | Mod: CPTII,S$GLB,, | Performed by: FAMILY MEDICINE

## 2023-10-11 PROCEDURE — 1159F MED LIST DOCD IN RCRD: CPT | Mod: CPTII,S$GLB,, | Performed by: FAMILY MEDICINE

## 2023-10-11 PROCEDURE — 82306 VITAMIN D 25 HYDROXY: CPT | Performed by: FAMILY MEDICINE

## 2023-10-11 PROCEDURE — 99386 PR PREVENTIVE VISIT,NEW,40-64: ICD-10-PCS | Mod: 25,S$GLB,, | Performed by: FAMILY MEDICINE

## 2023-10-11 PROCEDURE — 3008F BODY MASS INDEX DOCD: CPT | Mod: CPTII,S$GLB,, | Performed by: FAMILY MEDICINE

## 2023-10-11 PROCEDURE — 80053 COMPREHEN METABOLIC PANEL: CPT | Performed by: FAMILY MEDICINE

## 2023-10-11 PROCEDURE — 3079F DIAST BP 80-89 MM HG: CPT | Mod: CPTII,S$GLB,, | Performed by: FAMILY MEDICINE

## 2023-10-11 PROCEDURE — 85025 COMPLETE CBC W/AUTO DIFF WBC: CPT | Performed by: FAMILY MEDICINE

## 2023-10-11 PROCEDURE — 99386 PREV VISIT NEW AGE 40-64: CPT | Mod: 25,S$GLB,, | Performed by: FAMILY MEDICINE

## 2023-10-11 PROCEDURE — 84443 ASSAY THYROID STIM HORMONE: CPT | Performed by: FAMILY MEDICINE

## 2023-10-11 PROCEDURE — 83036 HEMOGLOBIN GLYCOSYLATED A1C: CPT | Performed by: FAMILY MEDICINE

## 2023-10-11 PROCEDURE — 90471 IMMUNIZATION ADMIN: CPT | Mod: S$GLB,,, | Performed by: FAMILY MEDICINE

## 2023-10-11 RX ORDER — ALENDRONATE SODIUM 70 MG/1
70 TABLET ORAL
Qty: 12 TABLET | Refills: 3 | Status: SHIPPED | OUTPATIENT
Start: 2023-10-11 | End: 2024-10-10

## 2023-10-11 RX ORDER — ALPRAZOLAM 0.25 MG/1
0.25 TABLET ORAL NIGHTLY PRN
Qty: 30 TABLET | Refills: 0 | Status: SHIPPED | OUTPATIENT
Start: 2023-10-11 | End: 2023-11-10

## 2023-10-11 NOTE — PROGRESS NOTES
"Subjective:       Patient ID: Livier Quintana is a 63 y.o. female.    Chief Complaint: Establish Care    Here today to establish care with a new PCP.   She was seeing Dr. Guallpa.  She moved to the [facility] and was lost to f/u for about 3 years.      Patient here today for annual well adult exam.   Tries to eat a healthy diet.  Exercises regularly.    Immunizations: Tdap 2017  Last Lab work: 2020   Colon Ca screening: Colonoscopy 2015  Breast Ca Screening: MMG 2020  Cervical Ca Screening:  PAP 2020 - will see her GYN (Bird)  Bone Density:  Osteoporosis in 2018.  Was on Fosamax for about a year.  Dexa due    HTN:  she was on Norvasc.  She has been off medication for 3 years.  Her bp has been controlled at home when she checks it.  Her BP today is 122/88.    SOB: chronic since 2018.  Had abnormal PFT in 2018 which was thought to be due to technique.  Has strong family history of lung disease, father with Pulm Fibrosis.      Review of Systems   Constitutional:  Negative for activity change, appetite change, fatigue and fever.   Respiratory:  Negative for cough, shortness of breath and wheezing.    Cardiovascular:  Negative for chest pain and palpitations.   Gastrointestinal:  Negative for abdominal pain, constipation, diarrhea and nausea.   Skin:  Negative for pallor and rash.   Neurological:  Negative for dizziness, syncope, light-headedness and headaches.   Psychiatric/Behavioral:  The patient is not nervous/anxious.        Objective:      Vitals:    10/11/23 0827   BP: 122/88   Pulse: 70   Temp: 98.2 °F (36.8 °C)   TempSrc: Oral   SpO2: 96%   Weight: 60 kg (132 lb 4.4 oz)   Height: 5' 2" (1.575 m)      Physical Exam  Constitutional:       General: She is not in acute distress.  Cardiovascular:      Rate and Rhythm: Normal rate and regular rhythm.      Heart sounds: Normal heart sounds. No murmur heard.  Pulmonary:      Effort: Pulmonary effort is normal. No respiratory distress.      Breath sounds: Normal breath " sounds. No wheezing, rhonchi or rales.   Musculoskeletal:         General: No swelling.   Skin:     General: Skin is warm and dry.   Neurological:      General: No focal deficit present.      Mental Status: She is alert.   Psychiatric:         Mood and Affect: Mood normal.         Behavior: Behavior normal.         Thought Content: Thought content normal.            Assessment:       1. Well adult exam    2. Essential hypertension    3. Age-related osteoporosis without current pathological fracture on DEXA 8/2018    4. Encounter for screening mammogram for breast cancer    5. Vitamin D deficiency    6. Shortness of breath    7. Anxiety, xanax for sleep mainly. melatonin with SE        Plan:       Well adult exam  -     CBC Auto Differential; Future; Expected date: 10/11/2023  -     Comprehensive Metabolic Panel; Future; Expected date: 10/11/2023  -     Hemoglobin A1C; Future; Expected date: 10/11/2023  -     Lipid Panel; Future; Expected date: 10/11/2023  -     TSH; Future; Expected date: 10/11/2023  -     Urinalysis, Reflex to Urine Culture Urine, Clean Catch; Future; Expected date: 10/11/2023  -     Vitamin D 25-Hydroxy; Future; Expected date: 10/11/2023  Continue to work on dietary improvements (decrease overall calorie intake, decrease sugar and carb intake, decrease animal protein intake)  Continue to exercise at least 30-40 minutes, 3 times per week  Immunizations were discussed and flu today  Preventative exams were discussed and MMG/Dexa ordered today.  Lab work today   Essential hypertension  -     Comprehensive Metabolic Panel; Future; Expected date: 10/11/2023    Age-related osteoporosis without current pathological fracture on DEXA 8/2018  -     DXA Bone Density Axial Skeleton 1 or more sites; Future; Expected date: 10/11/2023  -     alendronate (FOSAMAX) 70 MG tablet; Take 1 tablet (70 mg total) by mouth every 7 days.  Dispense: 12 tablet; Refill: 3    Encounter for screening mammogram for breast  cancer  -     Mammo Digital Screening Bilat w/ Melo; Future; Expected date: 10/11/2023    Vitamin D deficiency  -     Vitamin D 25-Hydroxy; Future; Expected date: 10/11/2023    Shortness of breath  -     Complete PFT w/ bronchodilator; Future  -     CT Chest Without Contrast; Future; Expected date: 10/11/2023  Recheck PFT and get CT chest.  Anxiety, xanax for sleep mainly. melatonin with SE  -     ALPRAZolam (XANAX) 0.25 MG tablet; Take 1 tablet (0.25 mg total) by mouth nightly as needed for Anxiety.  Dispense: 30 tablet; Refill: 0  Will refill Xanax for rare PRN use        Medication List with Changes/Refills   Changed and/or Refilled Medications    Modified Medication Previous Medication    ALENDRONATE (FOSAMAX) 70 MG TABLET alendronate (FOSAMAX) 70 MG tablet       Take 1 tablet (70 mg total) by mouth every 7 days.    Take 1 tablet (70 mg total) by mouth every 7 days.    ALPRAZOLAM (XANAX) 0.25 MG TABLET ALPRAZolam (XANAX) 0.25 MG tablet       Take 1 tablet (0.25 mg total) by mouth nightly as needed for Anxiety.    Take 1 tablet (0.25 mg total) by mouth nightly as needed for Anxiety.   Discontinued Medications    AMLODIPINE (NORVASC) 2.5 MG TABLET    Take 1 tablet (2.5 mg total) by mouth once daily.    NAPROXEN (NAPROSYN) 500 MG TABLET    Take 1 tablet (500 mg total) by mouth 2 (two) times daily.

## 2023-10-12 ENCOUNTER — TELEPHONE (OUTPATIENT)
Dept: PODIATRY | Facility: CLINIC | Age: 63
End: 2023-10-12
Payer: COMMERCIAL

## 2023-10-12 LAB
BASOPHILS # BLD AUTO: 0.04 K/UL (ref 0–0.2)
BASOPHILS NFR BLD: 0.5 % (ref 0–1.9)
DIFFERENTIAL METHOD: ABNORMAL
EOSINOPHIL # BLD AUTO: 0.1 K/UL (ref 0–0.5)
EOSINOPHIL NFR BLD: 1.3 % (ref 0–8)
ERYTHROCYTE [DISTWIDTH] IN BLOOD BY AUTOMATED COUNT: 12.8 % (ref 11.5–14.5)
HCT VFR BLD AUTO: 42.7 % (ref 37–48.5)
HGB BLD-MCNC: 13.2 G/DL (ref 12–16)
IMM GRANULOCYTES # BLD AUTO: 0.02 K/UL (ref 0–0.04)
IMM GRANULOCYTES NFR BLD AUTO: 0.3 % (ref 0–0.5)
LYMPHOCYTES # BLD AUTO: 2.5 K/UL (ref 1–4.8)
LYMPHOCYTES NFR BLD: 32 % (ref 18–48)
MCH RBC QN AUTO: 30.2 PG (ref 27–31)
MCHC RBC AUTO-ENTMCNC: 30.9 G/DL (ref 32–36)
MCV RBC AUTO: 98 FL (ref 82–98)
MONOCYTES # BLD AUTO: 0.6 K/UL (ref 0.3–1)
MONOCYTES NFR BLD: 7.6 % (ref 4–15)
NEUTROPHILS # BLD AUTO: 4.5 K/UL (ref 1.8–7.7)
NEUTROPHILS NFR BLD: 58.3 % (ref 38–73)
NRBC BLD-RTO: 0 /100 WBC
PLATELET # BLD AUTO: 265 K/UL (ref 150–450)
PMV BLD AUTO: 12 FL (ref 9.2–12.9)
RBC # BLD AUTO: 4.37 M/UL (ref 4–5.4)
WBC # BLD AUTO: 7.65 K/UL (ref 3.9–12.7)

## 2023-10-12 NOTE — TELEPHONE ENCOUNTER
----- Message from Ayesha Roth sent at 10/12/2023  2:24 PM CDT -----  Contact: Patient  Type:  Sooner Appointment Request    Caller is requesting a sooner appointment.  Caller declined first available appointment listed below.  Caller will not accept being placed on the waitlist and is requesting a message be sent to doctor.    Name of Caller:  Self  When is the first available appointment?  10/16/23  Symptoms:  ingrown toenail  Best Call Back Number:  [phone]  Additional Information:  Please call the patient back at the phone number listed above to advise. Thank you!

## 2023-10-12 NOTE — TELEPHONE ENCOUNTER
----- Message from Kyle Medina sent at 10/12/2023  3:06 PM CDT -----  Type:  Patient Returning Call    Who Called:  Patient  Who Left Message for Patient:  Lexus  Does the patient know what this is regarding?:  Ingrown toenail  Best Call Back Number:  [phone]  Additional Information:

## 2023-10-24 ENCOUNTER — HOSPITAL ENCOUNTER (OUTPATIENT)
Dept: RADIOLOGY | Facility: HOSPITAL | Age: 63
Discharge: HOME | End: 2023-10-24
Attending: FAMILY MEDICINE
Payer: COMMERCIAL

## 2023-10-24 DIAGNOSIS — Z12.31 ENCOUNTER FOR SCREENING MAMMOGRAM FOR BREAST CANCER: ICD-10-CM

## 2023-10-24 DIAGNOSIS — M81.0 AGE-RELATED OSTEOPOROSIS WITHOUT CURRENT PATHOLOGICAL FRACTURE: ICD-10-CM

## 2023-10-24 PROCEDURE — 77080 DXA BONE DENSITY AXIAL SKELETON 1 OR MORE SITES: ICD-10-PCS | Mod: 26,,, | Performed by: RADIOLOGY

## 2023-10-24 PROCEDURE — 77067 MAMMO DIGITAL SCREENING BILAT WITH TOMO: ICD-10-PCS | Mod: 26,,, | Performed by: RADIOLOGY

## 2023-10-24 PROCEDURE — 77063 MAMMO DIGITAL SCREENING BILAT WITH TOMO: ICD-10-PCS | Mod: 26,,, | Performed by: RADIOLOGY

## 2023-10-24 PROCEDURE — 77080 DXA BONE DENSITY AXIAL: CPT | Mod: TC,PO

## 2023-10-24 PROCEDURE — 77080 DXA BONE DENSITY AXIAL: CPT | Mod: 26,,, | Performed by: RADIOLOGY

## 2023-10-24 PROCEDURE — 77067 SCR MAMMO BI INCL CAD: CPT | Mod: 26,,, | Performed by: RADIOLOGY

## 2023-10-24 PROCEDURE — 77063 BREAST TOMOSYNTHESIS BI: CPT | Mod: 26,,, | Performed by: RADIOLOGY

## 2023-10-24 PROCEDURE — 77067 SCR MAMMO BI INCL CAD: CPT | Mod: TC,PO

## 2023-10-26 ENCOUNTER — HOSPITAL ENCOUNTER (OUTPATIENT)
Dept: RADIOLOGY | Facility: HOSPITAL | Age: 63
Discharge: HOME | End: 2023-10-26
Attending: FAMILY MEDICINE
Payer: COMMERCIAL

## 2023-10-26 DIAGNOSIS — R06.02 SHORTNESS OF BREATH: ICD-10-CM

## 2023-10-26 PROCEDURE — 71250 CT THORAX DX C-: CPT | Mod: TC,PO

## 2023-10-26 PROCEDURE — 71250 CT THORAX DX C-: CPT | Mod: 26,,, | Performed by: RADIOLOGY

## 2023-10-26 PROCEDURE — 71250 CT CHEST WITHOUT CONTRAST: ICD-10-PCS | Mod: 26,,, | Performed by: RADIOLOGY

## 2023-10-27 ENCOUNTER — TELEPHONE (OUTPATIENT)
Dept: PULMONOLOGY | Facility: CLINIC | Age: 63
End: 2023-10-27
Payer: COMMERCIAL

## 2023-10-27 ENCOUNTER — PATIENT MESSAGE (OUTPATIENT)
Dept: FAMILY MEDICINE | Facility: CLINIC | Age: 63
End: 2023-10-27
Payer: COMMERCIAL

## 2023-10-27 DIAGNOSIS — R06.02 SHORTNESS OF BREATH: Primary | ICD-10-CM

## 2023-10-27 DIAGNOSIS — E78.2 MIXED HYPERLIPIDEMIA: ICD-10-CM

## 2023-10-27 DIAGNOSIS — I10 ESSENTIAL HYPERTENSION: Chronic | ICD-10-CM

## 2023-10-27 NOTE — TELEPHONE ENCOUNTER
"----- Message from Mendy Mcclellan sent at 10/27/2023  2:24 PM CDT -----  Scheduling Request       Patient Status: Np    Scheduling Appt: From referral     Time/Date Preference: Soonest available       Contact Preference?: [phone] (home)      Treating Provider: Aura    Do you feel you need to be seen today? No      Additional Notes: requesting Dr Lee  "Thank you for all that you do for our patients"       "

## 2023-11-28 ENCOUNTER — OFFICE VISIT (OUTPATIENT)
Dept: PULMONOLOGY | Facility: CLINIC | Age: 63
End: 2023-11-28
Payer: COMMERCIAL

## 2023-11-28 ENCOUNTER — HOSPITAL ENCOUNTER (OUTPATIENT)
Dept: PULMONOLOGY | Facility: CLINIC | Age: 63
Discharge: HOME | End: 2023-11-28
Payer: COMMERCIAL

## 2023-11-28 ENCOUNTER — LAB VISIT (OUTPATIENT)
Dept: LAB | Facility: HOSPITAL | Age: 63
End: 2023-11-28
Payer: COMMERCIAL

## 2023-11-28 VITALS
HEART RATE: 72 BPM | WEIGHT: 131.81 LBS | DIASTOLIC BLOOD PRESSURE: 70 MMHG | BODY MASS INDEX: 24.26 KG/M2 | SYSTOLIC BLOOD PRESSURE: 124 MMHG | HEIGHT: 62 IN | OXYGEN SATURATION: 98 %

## 2023-11-28 DIAGNOSIS — R06.02 SHORTNESS OF BREATH: ICD-10-CM

## 2023-11-28 DIAGNOSIS — R91.8 LUNG NODULES: ICD-10-CM

## 2023-11-28 DIAGNOSIS — Z83.6 FAMILY HISTORY OF PULMONARY FIBROSIS: ICD-10-CM

## 2023-11-28 DIAGNOSIS — R91.8 LUNG NODULES: Primary | ICD-10-CM

## 2023-11-28 LAB
CCP AB SER IA-ACNC: <0.5 U/ML
CRP SERPL-MCNC: 2.3 MG/L (ref 0–8.2)
RHEUMATOID FACT SERPL-ACNC: <13 IU/ML (ref 0–15)

## 2023-11-28 PROCEDURE — 36415 COLL VENOUS BLD VENIPUNCTURE: CPT | Performed by: STUDENT IN AN ORGANIZED HEALTH CARE EDUCATION/TRAINING PROGRAM

## 2023-11-28 PROCEDURE — 94729 DIFFUSING CAPACITY: CPT | Mod: S$GLB,,, | Performed by: INTERNAL MEDICINE

## 2023-11-28 PROCEDURE — 1159F MED LIST DOCD IN RCRD: CPT | Mod: CPTII,S$GLB,, | Performed by: INTERNAL MEDICINE

## 2023-11-28 PROCEDURE — 3074F PR MOST RECENT SYSTOLIC BLOOD PRESSURE < 130 MM HG: ICD-10-PCS | Mod: CPTII,S$GLB,, | Performed by: INTERNAL MEDICINE

## 2023-11-28 PROCEDURE — 94729 PR C02/MEMBANE DIFFUSE CAPACITY: ICD-10-PCS | Mod: S$GLB,,, | Performed by: INTERNAL MEDICINE

## 2023-11-28 PROCEDURE — 94727 PR PULM FUNCTION TEST BY GAS: ICD-10-PCS | Mod: S$GLB,,, | Performed by: INTERNAL MEDICINE

## 2023-11-28 PROCEDURE — 3078F DIAST BP <80 MM HG: CPT | Mod: CPTII,S$GLB,, | Performed by: INTERNAL MEDICINE

## 2023-11-28 PROCEDURE — 99204 OFFICE O/P NEW MOD 45 MIN: CPT | Mod: 25,S$GLB,, | Performed by: INTERNAL MEDICINE

## 2023-11-28 PROCEDURE — 94060 EVALUATION OF WHEEZING: CPT | Mod: S$GLB,,, | Performed by: INTERNAL MEDICINE

## 2023-11-28 PROCEDURE — 94727 GAS DIL/WSHOT DETER LNG VOL: CPT | Mod: S$GLB,,, | Performed by: INTERNAL MEDICINE

## 2023-11-28 PROCEDURE — 86235 NUCLEAR ANTIGEN ANTIBODY: CPT | Performed by: STUDENT IN AN ORGANIZED HEALTH CARE EDUCATION/TRAINING PROGRAM

## 2023-11-28 PROCEDURE — 99204 PR OFFICE/OUTPT VISIT, NEW, LEVL IV, 45-59 MIN: ICD-10-PCS | Mod: 25,S$GLB,, | Performed by: INTERNAL MEDICINE

## 2023-11-28 PROCEDURE — 86431 RHEUMATOID FACTOR QUANT: CPT | Performed by: STUDENT IN AN ORGANIZED HEALTH CARE EDUCATION/TRAINING PROGRAM

## 2023-11-28 PROCEDURE — 86140 C-REACTIVE PROTEIN: CPT | Performed by: STUDENT IN AN ORGANIZED HEALTH CARE EDUCATION/TRAINING PROGRAM

## 2023-11-28 PROCEDURE — 3008F PR BODY MASS INDEX (BMI) DOCUMENTED: ICD-10-PCS | Mod: CPTII,S$GLB,, | Performed by: INTERNAL MEDICINE

## 2023-11-28 PROCEDURE — 86200 CCP ANTIBODY: CPT | Performed by: STUDENT IN AN ORGANIZED HEALTH CARE EDUCATION/TRAINING PROGRAM

## 2023-11-28 PROCEDURE — 3074F SYST BP LT 130 MM HG: CPT | Mod: CPTII,S$GLB,, | Performed by: INTERNAL MEDICINE

## 2023-11-28 PROCEDURE — 1159F PR MEDICATION LIST DOCUMENTED IN MEDICAL RECORD: ICD-10-PCS | Mod: CPTII,S$GLB,, | Performed by: INTERNAL MEDICINE

## 2023-11-28 PROCEDURE — 3044F PR MOST RECENT HEMOGLOBIN A1C LEVEL <7.0%: ICD-10-PCS | Mod: CPTII,S$GLB,, | Performed by: INTERNAL MEDICINE

## 2023-11-28 PROCEDURE — 3008F BODY MASS INDEX DOCD: CPT | Mod: CPTII,S$GLB,, | Performed by: INTERNAL MEDICINE

## 2023-11-28 PROCEDURE — 3078F PR MOST RECENT DIASTOLIC BLOOD PRESSURE < 80 MM HG: ICD-10-PCS | Mod: CPTII,S$GLB,, | Performed by: INTERNAL MEDICINE

## 2023-11-28 PROCEDURE — 86038 ANTINUCLEAR ANTIBODIES: CPT | Performed by: STUDENT IN AN ORGANIZED HEALTH CARE EDUCATION/TRAINING PROGRAM

## 2023-11-28 PROCEDURE — 3044F HG A1C LEVEL LT 7.0%: CPT | Mod: CPTII,S$GLB,, | Performed by: INTERNAL MEDICINE

## 2023-11-28 PROCEDURE — 86235 NUCLEAR ANTIGEN ANTIBODY: CPT | Mod: 59 | Performed by: STUDENT IN AN ORGANIZED HEALTH CARE EDUCATION/TRAINING PROGRAM

## 2023-11-28 PROCEDURE — 94060 PR EVAL OF BRONCHOSPASM: ICD-10-PCS | Mod: S$GLB,,, | Performed by: INTERNAL MEDICINE

## 2023-11-28 NOTE — ASSESSMENT & PLAN NOTE
Repeat CT Chest in 4 months with ILD protocol to assess for mosacism.   Will do lab workup for autoimmune disorders due to significant family history and abnormalities noted on PFTs and CT chest.   Ordered official 6 min walk - pt with O2 sats 98% and dropped to 93% with brief walk in office.   Need TTE to rule out cardiac pathology as patient states she has intermittent palpitations.

## 2023-11-28 NOTE — PROGRESS NOTES
Subjective:      Patient ID: Livier Quintana is a 63 y.o. female.    Chief Complaint: Shortness of Breath    HPI  62 yo F with h/o osteoporosis, HTN, and intermittent SOB with exertion as well as significant family history of pulmonary fibrosis who is here for shortness of breath and CT scan abnormalities.     Per the patient, she remembers having breathing tests in 2018 but she doesn't remember feeling short of breath. She hasn't noticed anything since that time until about a year ago when she started noticing that she was short of breath with stairs. She walks a mile several times a week without shortness of breath but does consistently feel like she has to stop and catch her breath after a flight of stairs. She does intermittently feel like her heart is racing, mostly with exertion and she does have anxiety. She has a strong family history of pulmonary problems described as pulmonary fibrosis. Her father passed from it, she has several paternal uncles effected as well as a paternal aunt. Her younger brother is seen here and has been diagnosed with rheumatoid associated ILD and was started on steroids and ofev this year. 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 She doesn't have any pets. No weight gain or loss or rashes. Denies joint pain.         Review of Systems   Constitutional:  Negative for fever, weight loss, weight gain, appetite change and fatigue.   HENT:  Negative for congestion.    Respiratory:  Positive for shortness of breath, pleurisy and dyspnea on extertion. Negative for cough, sputum production and wheezing.    Cardiovascular:  Positive for palpitations. Negative for chest pain and leg swelling.   Musculoskeletal:  Negative for joint swelling.   Skin:  Negative for rash.   Gastrointestinal:  Negative for nausea, vomiting and acid reflux.     Objective:     Physical Exam   Constitutional: She is oriented to person, place, and time. She appears  "well-developed and well-nourished.   NAD, conversing easily   HENT:   Head: Normocephalic.   Nose: Nose normal.   Cardiovascular: Normal rate and regular rhythm.   No murmur heard.  Pulmonary/Chest: Normal expansion, symmetric chest wall expansion, effort normal and breath sounds normal. No respiratory distress. She has no rales.   Abdominal: Soft.   Musculoskeletal:         General: No edema. Normal range of motion.      Cervical back: Normal range of motion.   Neurological: She is alert and oriented to person, place, and time.   Skin: Skin is warm and dry. No rash noted.   Nursing note and vitals reviewed.    Personal Diagnostic Review      11/28/2023     1:29 PM 10/11/2023     8:27 AM 10/19/2020    10:56 AM 2/20/2020     9:27 AM 9/10/2019     1:42 PM 10/5/2018     1:07 PM 8/20/2018     1:47 PM   Pulmonary Function Tests   SpO2 98 % 96 % 97 %  95 % 99 % 96 %   Height 5' 2" (1.575 m) 5' 2" (1.575 m) 5' 2" (1.575 m) 5' 2" (1.575 m) 5' 2" (1.575 m)     Weight 59.8 kg (131 lb 13.4 oz) 60 kg (132 lb 4.4 oz) 57.6 kg (127 lb) 57.9 kg (127 lb 10.3 oz) 60.2 kg (132 lb 9.7 oz) 60.8 kg (134 lb) 61.3 kg (135 lb 2.3 oz)   BMI (Calculated) 24.1 24.2 23.2 23.3 24.3       PFTs 11/28/23 7/6/2018   FVC  (pre-BD) 2.38 2.82   FVC%  85% 92%   FEV1 1.99 1.36   FEV1%  90% 56%   FEV1/FVC  84 48   FEF 25-75  2.44 1.01   FEF 25-75%  118% 44%   FVC (post-BD) 2.44    FVC% +2.7%    FEV1 2.09    FEV1% +5%    FEV1/FVC 86    FEF 25-75 2.57    TLC  4.09 4.54   TLC%  92% 99%   RV  1.71 1.54   RV%  96% 87%   DLCO  13.65 14.3   DLCO%  66% 65%   VA 3.55 3.70   IVC 2.36 2.20   Six-Minute Walk     Distance (meter)     O2 vee     O2 change       CT Chest, 10/11/2023: FINDINGS:  Prior pulmonary granulomatous disease.  There is subtle mosaic attenuation in the lungs, diffusely.  2 mm nodule right lung apex series 4, image 96.  4 mm nodule lateral segment right middle lobe series 4, image 294.  3 mm nodule perihilar left upper lobe series 4, image 237.  " Another 3 mm nodule target more superiorly in the left upper lobe can be seen series 4, image 110.  No filling defects central airways.  No pleural effusion or pneumothorax.  Normal size heart.  No mediastinal adenopathy.  Gallstone near the gallbladder neck.  No acute osseous abnormality.     Impression:  1. Mosaic attenuation in the lungs, a nonspecific finding with considerations to include air-trapping, mild pulmonary edema, pneumonitis, bronchiolitis.  2. Several pulmonary nodules. In a low risk patient, no further follow-up is recommended.  In a high-risk patient, 12 month follow-up CT could be considered.  3. Cholelith.     Assessment:     1. Lung nodules    2. Shortness of breath    3. Family history of pulmonary fibrosis         Outpatient Encounter Medications as of 11/28/2023   Medication Sig Dispense Refill    alendronate (FOSAMAX) 70 MG tablet Take 1 tablet (70 mg total) by mouth every 7 days. 12 tablet 3    ALPRAZolam (XANAX) 0.25 MG tablet Take 1 tablet (0.25 mg total) by mouth nightly as needed for Anxiety. 30 tablet 0     No facility-administered encounter medications on file as of 11/28/2023.     Orders Placed This Encounter   Procedures    CT Chest Without Contrast     Prone and supine, inspiratory and expiratory - ILD protocol     Standing Status:   Future     Standing Expiration Date:   11/28/2024     Scheduling Instructions:      Please schedule between 3/2024 and 5/2024     Order Specific Question:   May the Radiologist modify the order per protocol to meet the clinical needs of the patient?     Answer:   Yes    SANDRA     Standing Status:   Future     Number of Occurrences:   1     Standing Expiration Date:   1/26/2025    Myositis Specific 11 AB Panel     Standing Status:   Future     Number of Occurrences:   1     Standing Expiration Date:   1/26/2025    C-REACTIVE PROTEIN     Standing Status:   Future     Number of Occurrences:   1     Standing Expiration Date:   1/26/2025    RHEUMATOID FACTOR      Standing Status:   Future     Number of Occurrences:   1     Standing Expiration Date:   1/26/2025    CYCLIC CITRUL PEPTIDE ANTIBODY, IGG     Standing Status:   Future     Number of Occurrences:   1     Standing Expiration Date:   1/26/2025    ANTI -SSA ANTIBODY     Standing Status:   Future     Number of Occurrences:   1     Standing Expiration Date:   1/26/2025    ANTI-SSB ANTIBODY     Standing Status:   Future     Number of Occurrences:   1     Standing Expiration Date:   1/26/2025    Echo     Standing Status:   Future     Standing Expiration Date:   11/28/2024     Order Specific Question:   Release to patient     Answer:   Immediate    Stress test, pulmonary     Standing Status:   Future     Standing Expiration Date:   11/28/2024     Order Specific Question:   Reason for study     Answer:   Functional status     Order Specific Question:   Release to patient     Answer:   Immediate       Plan:     62yo F with h/o HTN and osteoporosis here with HALL with stairs and significant family history of lung disease. CT with subtle mosaicism and small nodules, will repeat in 4 months with ILD protocol. Further lab workup for autoimmune conditions as below and will fu after.     Problem List Items Addressed This Visit          Pulmonary    Shortness of breath    Overview     Developed over the past year, worse with exertion and walking up stairs.   Most recent PFTs with normal spirometry and lung volumes and DLCO that is stably decreased (c/w prior DLCO done 2018).   Subtle mosaicism noted in CT chest from 10/2023 with extensive family history of pulmonary fibrosis.          Current Assessment & Plan     Repeat CT Chest in 4 months with ILD protocol to assess for mosacism.   Will do lab workup for autoimmune disorders due to significant family history and abnormalities noted on PFTs and CT chest.   Ordered official 6 min walk - pt with O2 sats 98% and dropped to 93% with brief walk in office.   Need TTE to rule out  cardiac pathology as patient states she has intermittent palpitations.          Relevant Orders    SANDRA    Myositis Specific 11 AB Panel    C-REACTIVE PROTEIN    RHEUMATOID FACTOR    CYCLIC CITRUL PEPTIDE ANTIBODY, IGG    ANTI -SSA ANTIBODY    ANTI-SSB ANTIBODY    Echo    Stress test, pulmonary    CT Chest Without Contrast    Lung nodules - Primary    Overview     CT Chest from Oct 2023, 4 small nodules noted.          Current Assessment & Plan     Will reassess with repeat CT scan as pt with subtle mosaicism (see SOB) which will also fu nodules.          Relevant Orders    SANDRA    Myositis Specific 11 AB Panel    C-REACTIVE PROTEIN    RHEUMATOID FACTOR    CYCLIC CITRUL PEPTIDE ANTIBODY, IGG    ANTI -SSA ANTIBODY    ANTI-SSB ANTIBODY    Echo    Stress test, pulmonary    CT Chest Without Contrast     Other Visit Diagnoses       Family history of pulmonary fibrosis        Relevant Orders    SANDRA    Myositis Specific 11 AB Panel    C-REACTIVE PROTEIN    RHEUMATOID FACTOR    CYCLIC CITRUL PEPTIDE ANTIBODY, IGG    ANTI -SSA ANTIBODY    ANTI-SSB ANTIBODY    Echo    Stress test, pulmonary    CT Chest Without Contrast          Fu in clinic in 4 months.     MD BROOKE Lopez/[facility]   11/28/2023 1:33 PM

## 2023-11-28 NOTE — ASSESSMENT & PLAN NOTE
Will reassess with repeat CT scan as pt with subtle mosaicism (see SOB) which will also fu nodules.

## 2023-11-29 ENCOUNTER — HOSPITAL ENCOUNTER (OUTPATIENT)
Dept: PULMONOLOGY | Facility: CLINIC | Age: 63
Discharge: HOME | End: 2023-11-29
Payer: COMMERCIAL

## 2023-11-29 ENCOUNTER — HOSPITAL ENCOUNTER (OUTPATIENT)
Dept: CARDIOLOGY | Facility: HOSPITAL | Age: 63
Discharge: HOME | End: 2023-11-29
Attending: STUDENT IN AN ORGANIZED HEALTH CARE EDUCATION/TRAINING PROGRAM
Payer: COMMERCIAL

## 2023-11-29 VITALS — HEIGHT: 61 IN | WEIGHT: 132 LBS | BODY MASS INDEX: 24.92 KG/M2

## 2023-11-29 VITALS
HEIGHT: 62 IN | HEART RATE: 68 BPM | DIASTOLIC BLOOD PRESSURE: 75 MMHG | SYSTOLIC BLOOD PRESSURE: 125 MMHG | BODY MASS INDEX: 24.11 KG/M2 | WEIGHT: 131 LBS

## 2023-11-29 DIAGNOSIS — R06.02 SHORTNESS OF BREATH: ICD-10-CM

## 2023-11-29 DIAGNOSIS — R91.8 LUNG NODULES: ICD-10-CM

## 2023-11-29 DIAGNOSIS — Z83.6 FAMILY HISTORY OF PULMONARY FIBROSIS: ICD-10-CM

## 2023-11-29 LAB
ANA SER QL IF: NORMAL
ASCENDING AORTA: 2.77 CM
AV INDEX (PROSTH): 0.84
AV MEAN GRADIENT: 2 MMHG
AV PEAK GRADIENT: 3 MMHG
AV VALVE AREA BY VELOCITY RATIO: 3.39 CM²
AV VALVE AREA: 3.58 CM²
AV VELOCITY RATIO: 0.8
BSA FOR ECHO PROCEDURE: 1.61 M2
CV ECHO LV RWT: 0.46 CM
DOP CALC AO PEAK VEL: 0.88 M/S
DOP CALC AO VTI: 19.93 CM
DOP CALC LVOT AREA: 4.3 CM2
DOP CALC LVOT DIAMETER: 2.33 CM
DOP CALC LVOT PEAK VEL: 0.7 M/S
DOP CALC LVOT STROKE VOLUME: 71.26 CM3
DOP CALCLVOT PEAK VEL VTI: 16.72 CM
E WAVE DECELERATION TIME: 264.87 MSEC
E/A RATIO: 0.73
E/E' RATIO: 8.55 M/S
ECHO LV POSTERIOR WALL: 0.93 CM (ref 0.6–1.1)
EJECTION FRACTION: 55 %
FRACTIONAL SHORTENING: 14 % (ref 28–44)
INTERVENTRICULAR SEPTUM: 0.61 CM (ref 0.6–1.1)
IVRT: 122.74 MSEC
LA MAJOR: 3.53 CM
LA MINOR: 4.21 CM
LA WIDTH: 3.86 CM
LEFT ATRIUM SIZE: 2.5 CM
LEFT ATRIUM VOLUME INDEX MOD: 18.7 ML/M2
LEFT ATRIUM VOLUME INDEX: 19.7 ML/M2
LEFT ATRIUM VOLUME MOD: 29.96 CM3
LEFT ATRIUM VOLUME: 31.5 CM3
LEFT INTERNAL DIMENSION IN SYSTOLE: 3.51 CM (ref 2.1–4)
LEFT VENTRICLE DIASTOLIC VOLUME INDEX: 45.54 ML/M2
LEFT VENTRICLE DIASTOLIC VOLUME: 72.87 ML
LEFT VENTRICLE MASS INDEX: 57 G/M2
LEFT VENTRICLE SYSTOLIC VOLUME INDEX: 32.1 ML/M2
LEFT VENTRICLE SYSTOLIC VOLUME: 51.35 ML
LEFT VENTRICULAR INTERNAL DIMENSION IN DIASTOLE: 4.07 CM (ref 3.5–6)
LEFT VENTRICULAR MASS: 91.4 G
LV LATERAL E/E' RATIO: 6.71 M/S
LV SEPTAL E/E' RATIO: 11.75 M/S
MV A" WAVE DURATION": 12.56 MSEC
MV PEAK A VEL: 0.64 M/S
MV PEAK E VEL: 0.47 M/S
PISA TR MAX VEL: 2.39 M/S
PULM VEIN S/D RATIO: 1.47
PV PEAK D VEL: 0.3 M/S
PV PEAK S VEL: 0.44 M/S
RA MAJOR: 4.12 CM
RA PRESSURE ESTIMATED: 3 MMHG
RA WIDTH: 3.62 CM
RIGHT VENTRICULAR END-DIASTOLIC DIMENSION: 3.39 CM
RV TB RVSP: 5 MMHG
SINUS: 3.16 CM
STJ: 2.87 CM
TDI LATERAL: 0.07 M/S
TDI SEPTAL: 0.04 M/S
TDI: 0.06 M/S
TR MAX PG: 23 MMHG
TRICUSPID ANNULAR PLANE SYSTOLIC EXCURSION: 1.78 CM
TV REST PULMONARY ARTERY PRESSURE: 26 MMHG
Z-SCORE OF LEFT VENTRICULAR DIMENSION IN END DIASTOLE: -1.12
Z-SCORE OF LEFT VENTRICULAR DIMENSION IN END SYSTOLE: 1.72

## 2023-11-29 PROCEDURE — 93306 TTE W/DOPPLER COMPLETE: CPT

## 2023-11-29 PROCEDURE — 93306 TTE W/DOPPLER COMPLETE: CPT | Mod: 26,,, | Performed by: INTERNAL MEDICINE

## 2023-11-29 PROCEDURE — 94618 PULMONARY STRESS TESTING: ICD-10-PCS | Mod: S$GLB,,, | Performed by: INTERNAL MEDICINE

## 2023-11-29 PROCEDURE — 94618 PULMONARY STRESS TESTING: CPT | Mod: S$GLB,,, | Performed by: INTERNAL MEDICINE

## 2023-11-29 PROCEDURE — 93306 ECHO (CUPID ONLY): ICD-10-PCS | Mod: 26,,, | Performed by: INTERNAL MEDICINE

## 2023-11-29 NOTE — PROCEDURES
Livier Quintana is a 63 y.o.  female patient, who presents for a 6 minute walk test ordered by MD Britney.  The diagnosis is Shortness of Breath.  The patient's BMI is 25 kg/m2.  Predicted distance (lower limit of normal) is 366.37 meters.      Test Results:    The test was completed without stopping. The total time walked was 360 seconds. During walking, the patient reported:  No complaints. The patient used no assistive devices during testing.     11/29/2023---------Distance: 487.68 meters (1600 feet)     O2 Sat % Supplemental Oxygen Heart Rate Blood Pressure Jose Rafael Scale   Pre-exercise  (Resting) 98 % Room Air 63 bpm 117/73 mmHg 0   During Exercise 96 % Room Air 94 bpm 132/72 mmHg 0.5   Post-exercise  (Recovery) 98 % Room Air  73 bpm       Recovery Time: 60 seconds    Performing nurse/tech: Napoleon HURTADO      PREVIOUS STUDY:   The patient has not had a previous study.      CLINICAL INTERPRETATION:  Six minute walk distance is 487.68 meters (1600 feet) with very, very light dyspnea.  During exercise, there was no significant desaturation while breathing room air.  Blood pressure remained stable and Heart rate increased significantly with walking.  The patient did not report non-pulmonary symptoms during exercise.  No previous study performed.  Based upon age and body mass index, exercise capacity is normal.

## 2023-11-30 LAB
ANTI-SSA ANTIBODY: 0.07 RATIO (ref 0–0.99)
ANTI-SSA INTERPRETATION: NEGATIVE
ANTI-SSB ANTIBODY: 0.08 RATIO (ref 0–0.99)
ANTI-SSB INTERPRETATION: NEGATIVE

## 2023-12-05 NOTE — PROGRESS NOTES
Ms. Quintana:  All your blood work looks normal with no evidence to suggest any auto-immune condition(s) at this time.  Your walking test showed normal exercise capacity and oxygen levels while walking.  Your echocardiogram showed normal heart function without evidence of pulmonary hypertension.      Based upon all these test results, there is no present evidence of pulmonary fibrosis or interstitial lung disease despite your very significant family history.  We will plan to repeat a chest CT scan next Spring.  Take care, DET

## 2023-12-07 ENCOUNTER — PATIENT MESSAGE (OUTPATIENT)
Dept: PULMONOLOGY | Facility: CLINIC | Age: 63
End: 2023-12-07
Payer: COMMERCIAL

## 2024-08-09 ENCOUNTER — TELEPHONE (OUTPATIENT)
Dept: OPHTHALMOLOGY | Facility: CLINIC | Age: 64
End: 2024-08-09
Payer: COMMERCIAL

## 2024-08-21 ENCOUNTER — PATIENT MESSAGE (OUTPATIENT)
Dept: ADMINISTRATIVE | Facility: HOSPITAL | Age: 64
End: 2024-08-21
Payer: COMMERCIAL

## 2024-08-23 ENCOUNTER — TELEPHONE (OUTPATIENT)
Dept: FAMILY MEDICINE | Facility: CLINIC | Age: 64
End: 2024-08-23
Payer: COMMERCIAL

## 2024-08-23 NOTE — TELEPHONE ENCOUNTER
----- Message from Alicia Calvin sent at 8/23/2024  9:22 AM CDT -----  Contact: self  Type: Needs Medical Advice  Who Called:  pt  Best Call Back Number: [phone]   Additional Information: pt would like orders for mammo.please advise

## 2024-08-27 ENCOUNTER — PATIENT OUTREACH (OUTPATIENT)
Dept: ADMINISTRATIVE | Facility: HOSPITAL | Age: 64
End: 2024-08-27
Payer: COMMERCIAL

## 2024-08-27 DIAGNOSIS — Z12.31 ENCOUNTER FOR SCREENING MAMMOGRAM FOR MALIGNANT NEOPLASM OF BREAST: Primary | ICD-10-CM

## 2024-08-27 NOTE — PROGRESS NOTES
Population Health Chart Review & Patient Outreach Details      Additional [facility] Notes:               Updates Requested / Reviewed:      Updated Care Coordination Note and Immunizations Reconciliation Completed or Queried: [facility] Topics Overdue:      [facility] ER Score: 0     Patient is not due for any topics at this time.    Pneumonia Vaccine  Shingles/Zoster Vaccine  RSV Vaccine                  Health Maintenance Topic(s) Outreach Outcomes & Actions Taken:    Breast Cancer Screening - Outreach Outcomes & Actions Taken  : Mammogram Order Placed and Mammogram Screening Scheduled

## 2024-08-28 ENCOUNTER — TELEPHONE (OUTPATIENT)
Dept: PULMONOLOGY | Facility: CLINIC | Age: 64
End: 2024-08-28
Payer: COMMERCIAL

## 2024-08-28 NOTE — TELEPHONE ENCOUNTER
Spoke with patient, informed her that I have received her message. I also advised pt that Dr Tan does not have any available appointments at this time but however I can place pt appointment on wait list in case some one cancels there appointment.

## 2024-08-28 NOTE — TELEPHONE ENCOUNTER
----- Message from Kathy Pressley sent at 8/28/2024 10:16 AM CDT -----  Consult/Advisory    Name Of Caller:Livier       Contact Preference:[phone]    Nature of call: Pt called regarding a fu appt I did set her for Dec she stated she know the appt was supposed to be sooner. She is asking for a call

## 2024-09-09 ENCOUNTER — OFFICE VISIT (OUTPATIENT)
Dept: OBSTETRICS AND GYNECOLOGY | Facility: CLINIC | Age: 64
End: 2024-09-09
Payer: COMMERCIAL

## 2024-09-09 VITALS — DIASTOLIC BLOOD PRESSURE: 84 MMHG | WEIGHT: 138.69 LBS | SYSTOLIC BLOOD PRESSURE: 132 MMHG | BODY MASS INDEX: 26.2 KG/M2

## 2024-09-09 DIAGNOSIS — N95.1 VAGINAL DRYNESS, MENOPAUSAL: ICD-10-CM

## 2024-09-09 DIAGNOSIS — Z01.419 ENCOUNTER FOR ANNUAL ROUTINE GYNECOLOGICAL EXAMINATION: Primary | ICD-10-CM

## 2024-09-09 DIAGNOSIS — Z80.3 FAMILY HISTORY OF BREAST CANCER IN SISTER: ICD-10-CM

## 2024-09-09 DIAGNOSIS — Z98.890 HISTORY OF ENDOMETRIAL ABLATION: ICD-10-CM

## 2024-09-09 PROCEDURE — 3075F SYST BP GE 130 - 139MM HG: CPT | Mod: CPTII,S$GLB,, | Performed by: OBSTETRICS & GYNECOLOGY

## 2024-09-09 PROCEDURE — 3008F BODY MASS INDEX DOCD: CPT | Mod: CPTII,S$GLB,, | Performed by: OBSTETRICS & GYNECOLOGY

## 2024-09-09 PROCEDURE — 1159F MED LIST DOCD IN RCRD: CPT | Mod: CPTII,S$GLB,, | Performed by: OBSTETRICS & GYNECOLOGY

## 2024-09-09 PROCEDURE — 88175 CYTOPATH C/V AUTO FLUID REDO: CPT | Performed by: OBSTETRICS & GYNECOLOGY

## 2024-09-09 PROCEDURE — 99386 PREV VISIT NEW AGE 40-64: CPT | Mod: S$GLB,,, | Performed by: OBSTETRICS & GYNECOLOGY

## 2024-09-09 PROCEDURE — 1160F RVW MEDS BY RX/DR IN RCRD: CPT | Mod: CPTII,S$GLB,, | Performed by: OBSTETRICS & GYNECOLOGY

## 2024-09-09 PROCEDURE — 3079F DIAST BP 80-89 MM HG: CPT | Mod: CPTII,S$GLB,, | Performed by: OBSTETRICS & GYNECOLOGY

## 2024-09-09 PROCEDURE — 99999 PR PBB SHADOW E&M-EST. PATIENT-LVL III: CPT | Mod: PBBFAC,,, | Performed by: OBSTETRICS & GYNECOLOGY

## 2024-09-09 RX ORDER — ESTRADIOL 0.1 MG/G
1 CREAM VAGINAL DAILY
Qty: 42.5 G | Refills: 1 | Status: SHIPPED | OUTPATIENT
Start: 2024-09-09 | End: 2024-09-09

## 2024-09-09 RX ORDER — ESTRADIOL 0.1 MG/G
1 CREAM VAGINAL
Qty: 42.5 G | Refills: 1 | Status: SHIPPED | OUTPATIENT
Start: 2024-09-09 | End: 2025-09-09

## 2024-09-09 NOTE — PROGRESS NOTES
Chief Complaint   Patient presents with    Establish Care       History and Physical:  Patient's last menstrual period was 2012.       Livier Quintana is a 64 y.o.  WF who presents today for her routine annual GYN exam. The patient has no Gynecology complaints today. Vaginal dryness      Allergies: Review of patient's allergies indicates:  No Known Allergies    Past Medical History:   Diagnosis Date    Amblyopia     Cataract     Family history of breast cancer     SISTER WITH RECURRENCE    Family history of pulmonary fibrosis     Fissure, anal     inner     Hypertension     Nuclear sclerosis - Both Eyes 2012    Strabismus        Past Surgical History:   Procedure Laterality Date     SECTION, CLASSIC      x3    ENDOMETRIAL ABLATION      KJB---D&C HSCOPE/HTA    STRABISMUS SURGERY      X 2       MEDS:   Current Outpatient Medications on File Prior to Visit   Medication Sig Dispense Refill    alendronate (FOSAMAX) 70 MG tablet Take 1 tablet (70 mg total) by mouth every 7 days. 12 tablet 3    ALPRAZolam (XANAX) 0.25 MG tablet Take 1 tablet (0.25 mg total) by mouth nightly as needed for Anxiety. 30 tablet 0     No current facility-administered medications on file prior to visit.       OB History          6    Para   6    Term   3            AB        Living             SAB        IAB        Ectopic        Multiple        Live Births                     Social History     Socioeconomic History    Marital status:    Tobacco Use    Smoking status: Never    Smokeless tobacco: Never   Substance and Sexual Activity    Alcohol use: No     Comment: socially    Drug use: No    Sexual activity: Not Currently     Partners: Male     Social Determinants of Health     Stress: No Stress Concern Present (9/10/2019)    [employer] - Occupational Stress Questionnaire     Feeling of Stress : Not at all       Family History   Problem Relation Name Age of  Onset    Amblyopia Mother      Rheum arthritis Mother      Cataracts Mother      Heart disease Father      Hypertension Father      Pulmonary fibrosis Father      Arrhythmia Father      Cataracts Father      Amblyopia Sister      Glaucoma Sister          narrow angle     Breast cancer Sister  32    Diabetes Maternal Grandmother      Breast cancer Maternal Cousin      Ovarian cancer Neg Hx      Colon cancer Neg Hx      Melanoma Neg Hx      Psoriasis Neg Hx      Lupus Neg Hx      Blindness Neg Hx      Macular degeneration Neg Hx      Retinal detachment Neg Hx      Strabismus Neg Hx           Past medical and surgical history reviewed.   I have reviewed the patient's medical history in detail and updated the computerized patient record.        Review of System:   General: no chills, fever, night sweats, weight gain or weight loss  Psychological: no depression or suicidal ideation  Breasts: no new or changing breast lumps, nipple discharge or masses.  Respiratory: no cough, shortness of breath, or wheezing  Cardiovascular: no chest pain or dyspnea on exertion  Gastrointestinal: no abdominal pain, change in bowel habits, or black or bloody stools  Genito-Urinary: no incontinence, urinary frequency/urgency or vulvar/vaginal symptoms, pelvic pain or abnormal vaginal bleeding.  Musculoskeletal: no gait disturbance or muscular weakness      Physical Exam:   /84   Wt 62.9 kg (138 lb 10.7 oz)   LMP 09/01/2012   BMI 26.20 kg/m²   Constitutional: She appears alert and responsive. She appears well-developed, well-groomed, and well-nourished. No distress.   HENT:   Head: Normocephalic and atraumatic.   Eyes: Conjunctivae and EOM are normal. No scleral icterus.   Neck: Symmetrical. Normal range of motion. Neck supple. No tracheal deviation present. THYROID:  without masses or tenderness.  Cardiovascular: Normal rate, no rhythm abnormality noted. Extremities without swelling or edema, warm.    Pulmonary/Chest: Normal  respiratory Effort. No distress or retractions. She exhibits no tenderness.  Breasts: are symmetrical.no masses   Right breast exhibits no inverted nipple, no mass, no nipple discharge, no skin change and no tenderness.   Left breast exhibits no inverted nipple, no mass, no nipple discharge, no skin change and no tenderness.  Abdominal: Soft. She exhibits no distension, hernias or masses. There is no tenderness. No enlargement of liver edge or spleen.  There is no rebound and no guarding.   Genitourinary:    External rectal exam shows no thrombosed external hemorrhoids, no lesions.     Pelvic exam was performed with patient supine.   No labial fusion, and symmetrical.    There is no rash, lesion or injury on the right labia.   There is no rash, lesion or injury on the left labia.   No bleeding and no signs of injury around the vaginal introitus, urethral meatus is normal size and without prolapse or lesions, urethra well supported. The cervix is visualized with no discharge, lesions or friability.   No vaginal discharge found.    No significant Cystocele, Enterocele or rectocele, and cervix and uterus well supported.   Bimanual exam:   The urethra is normal to palpation and there are no palpable vaginal wall masses.   Uterus is not deviated, not enlarged, not fixed, normal shape and not tender.   Cervix exhibits no motion tenderness.    Right adnexum displays no mass or nodularity and no tenderness.   Left adnexum displays no mass or nodularity and no tenderness.  Musculoskeletal: Normal range of motion.   Lymphadenopathy: No inguinal adenopathy present.   Neurological: She is alert and oriented to person, place, and time. Coordination normal.   Skin: Skin is warm and dry. She is not diaphoretic. No rashes, lesions or ulcers.   Psychiatric: She has a normal mood and affect, oriented to person, place, and time.      Assessment:   Normal annual GYN exam  1. Encounter for annual routine gynecological examination   Liquid-Based Pap Smear, Screening      2. History of endometrial ablation        3. Family history of breast cancer in sister        4. Vaginal dryness, menopausal        Hx abnl pap yrs ago  Sister neg BrCa gene    Plan:   PAP  Mammogram  Trial estrace cream  Follow up in 1 year.  Patient informed will be contacted with results within 2 weeks. Encouraged to please call back or email if she has not heard from us by then.

## 2024-09-12 LAB
CLINICAL INFO: NORMAL
DATE OF PREVIOUS PAP: NORMAL
DATE PREVIOUS BX: NO
LMP START DATE: NORMAL
SPECIMEN SOURCE CVX/VAG CYTO: NORMAL

## 2024-10-09 ENCOUNTER — TELEPHONE (OUTPATIENT)
Dept: FAMILY MEDICINE | Facility: CLINIC | Age: 64
End: 2024-10-09
Payer: COMMERCIAL

## 2024-10-09 DIAGNOSIS — E78.2 MIXED HYPERLIPIDEMIA: ICD-10-CM

## 2024-10-09 DIAGNOSIS — I10 ESSENTIAL HYPERTENSION: ICD-10-CM

## 2024-10-09 DIAGNOSIS — Z00.00 WELL ADULT EXAM: Primary | ICD-10-CM

## 2024-10-09 NOTE — TELEPHONE ENCOUNTER
----- Message from Nuka [employer] sent at 10/9/2024  4:16 PM CDT -----  Type:  Needs Medical Advice    Who Called: the patient  Symptoms (please be specific):  How long has patient had these symptoms:    Pharmacy name and phone #:    Would the patient rather a call back or a response via [facility]? call back/  Best Call Back Number: [phone]   Additional Information: Pt states she would like labs ordered for annual  Please advise  Thanks

## 2024-10-09 NOTE — TELEPHONE ENCOUNTER
----- Message from [employer] sent at 10/9/2024  4:16 PM CDT -----  Type:  Needs Medical Advice    Who Called: the patient  Symptoms (please be specific):  How long has patient had these symptoms:    Pharmacy name and phone #:    Would the patient rather a call back or a response via [facility]? call back/  Best Call Back Number: [phone]   Additional Information: Pt states she would like labs ordered for annual  Please advise  Thanks

## 2024-10-16 ENCOUNTER — OFFICE VISIT (OUTPATIENT)
Dept: FAMILY MEDICINE | Facility: CLINIC | Age: 64
End: 2024-10-16
Payer: COMMERCIAL

## 2024-10-16 VITALS
BODY MASS INDEX: 26.06 KG/M2 | HEART RATE: 71 BPM | OXYGEN SATURATION: 95 % | HEIGHT: 61 IN | WEIGHT: 138 LBS | TEMPERATURE: 98 F | DIASTOLIC BLOOD PRESSURE: 82 MMHG | SYSTOLIC BLOOD PRESSURE: 118 MMHG

## 2024-10-16 DIAGNOSIS — M81.0 AGE-RELATED OSTEOPOROSIS WITHOUT CURRENT PATHOLOGICAL FRACTURE: ICD-10-CM

## 2024-10-16 DIAGNOSIS — Z23 NEED FOR VACCINATION: ICD-10-CM

## 2024-10-16 DIAGNOSIS — I10 ESSENTIAL HYPERTENSION: ICD-10-CM

## 2024-10-16 DIAGNOSIS — Z00.00 WELL ADULT EXAM: Primary | ICD-10-CM

## 2024-10-16 DIAGNOSIS — E78.2 MODERATE MIXED HYPERLIPIDEMIA NOT REQUIRING STATIN THERAPY: ICD-10-CM

## 2024-10-16 DIAGNOSIS — F41.9 ANXIETY: Chronic | ICD-10-CM

## 2024-10-16 PROCEDURE — 1160F RVW MEDS BY RX/DR IN RCRD: CPT | Mod: CPTII,S$GLB,, | Performed by: FAMILY MEDICINE

## 2024-10-16 PROCEDURE — 90656 IIV3 VACC NO PRSV 0.5 ML IM: CPT | Mod: S$GLB,,, | Performed by: FAMILY MEDICINE

## 2024-10-16 PROCEDURE — 99999 PR PBB SHADOW E&M-EST. PATIENT-LVL III: CPT | Mod: PBBFAC,,, | Performed by: FAMILY MEDICINE

## 2024-10-16 PROCEDURE — 99396 PREV VISIT EST AGE 40-64: CPT | Mod: 25,S$GLB,, | Performed by: FAMILY MEDICINE

## 2024-10-16 PROCEDURE — 3074F SYST BP LT 130 MM HG: CPT | Mod: CPTII,S$GLB,, | Performed by: FAMILY MEDICINE

## 2024-10-16 PROCEDURE — 3079F DIAST BP 80-89 MM HG: CPT | Mod: CPTII,S$GLB,, | Performed by: FAMILY MEDICINE

## 2024-10-16 PROCEDURE — 1159F MED LIST DOCD IN RCRD: CPT | Mod: CPTII,S$GLB,, | Performed by: FAMILY MEDICINE

## 2024-10-16 PROCEDURE — 3008F BODY MASS INDEX DOCD: CPT | Mod: CPTII,S$GLB,, | Performed by: FAMILY MEDICINE

## 2024-10-16 PROCEDURE — 90471 IMMUNIZATION ADMIN: CPT | Mod: S$GLB,,, | Performed by: FAMILY MEDICINE

## 2024-10-16 RX ORDER — ALPRAZOLAM 0.25 MG/1
0.25 TABLET ORAL NIGHTLY PRN
Qty: 30 TABLET | Refills: 0 | Status: SHIPPED | OUTPATIENT
Start: 2024-10-16 | End: 2024-11-17

## 2024-10-16 NOTE — PROGRESS NOTES
"Subjective:       Patient ID: Livier Quintana is a 64 y.o. female.    Chief Complaint: Annual Exam    Patient here today for annual well adult exam.   Tries to eat a healthy diet.  Exercises regularly.    Immunizations: Tdap 2017  Last Lab work: 2023   Colon Ca screening: Colonoscopy 2015  Breast Ca Screening: MMG 2023  Cervical Ca Screening:  PAP 2024   Bone Density:  Osteoporosis in 2023.  Back on Fosamax for about a year, she is having issues with GI side effects.     HTN:  She has been off medication for 4 years.  Her bp has been controlled at home when she checks it.      SOB: chronic since 2018.  Had abnormal PFT in 2018 which was thought to be due to technique.  Has strong family history of lung disease, father with Pulm Fibrosis.  Saw Pulm and will repeat Chest CT soon.    HLD:  elevated in 2023.  The 10-year ASCVD risk score (Christiano HUMPHREYS, et al., 2019) is: 3.8%    Values used to calculate the score:      Age: 64 years      Sex: Female      Is Non- : No      Diabetic: No      Tobacco smoker: No      Systolic Blood Pressure: 118 mmHg      Is BP treated: No      HDL Cholesterol: 60 mg/dL      Total Cholesterol: 250 mg/dL       Review of Systems   Constitutional:  Negative for activity change, appetite change, fatigue and fever.   Respiratory:  Negative for cough, shortness of breath and wheezing.    Cardiovascular:  Negative for chest pain and palpitations.   Gastrointestinal:  Negative for abdominal pain, constipation, diarrhea and nausea.   Skin:  Negative for pallor and rash.   Neurological:  Negative for dizziness, syncope, light-headedness and headaches.   Psychiatric/Behavioral:  The patient is not nervous/anxious.        Objective:      Vitals:    10/16/24 1525   BP: 118/82   Pulse: 71   Temp: 97.6 °F (36.4 °C)   TempSrc: Oral   SpO2: 95%   Weight: 62.6 kg (138 lb 0.1 oz)   Height: 5' 1" (1.549 m)      Physical Exam  Constitutional:       General: She is not in acute " distress.  Cardiovascular:      Rate and Rhythm: Normal rate and regular rhythm.      Heart sounds: Normal heart sounds. No murmur heard.  Pulmonary:      Effort: Pulmonary effort is normal. No respiratory distress.      Breath sounds: Normal breath sounds. No wheezing, rhonchi or rales.   Musculoskeletal:         General: No swelling.   Skin:     General: Skin is warm and dry.   Neurological:      General: No focal deficit present.      Mental Status: She is alert.   Psychiatric:         Mood and Affect: Mood normal.         Behavior: Behavior normal.         Thought Content: Thought content normal.         Results for orders placed or performed in visit on 09/09/24   HPV DNA, High Risk with Reflex to Genotype 16,18    Collection Time: 09/09/24 10:46 AM   Result Value Ref Range    HPV DNA High Risk Not Detected NOT DETECTED   Pap Smear, Thin Prep with Reflex to HPV    Collection Time: 09/09/24 10:46 AM   Result Value Ref Range    Cytology ThinPrep Pap Source Cervix     Cytology ThinPrep Pap Report Status DNR     Cytology Thinprep PAP Clinical History Routine exam     Cytology ThinPrep Pap LMP 09/01/2012     Cytology ThinPrep Previous PAP Unknown     Cytology ThinPrep Previous Biopsy No     Cytology ThinPrep PAP Adequacy SEE BELOW     Cytology ThinPrep PAP General Categorization SEE BELOW (A)     Cytology ThinPrep PAP Interpretation SEE BELOW (A)     Cytology ThinPrep PAP Comment SEE BELOW     Cytotechnologist SEE BELOW     Review Cytotechnologist DNR     Pathologist SEE BELOW     Cytology ThinPrep PAP Infection DNR     Cytology Thin Prep Pap Explanation SEE BELOW       Assessment:       1. Well adult exam    2. Essential hypertension    3. Moderate mixed hyperlipidemia not requiring statin therapy    4. Age-related osteoporosis without current pathological fracture on DEXA 8/2018    5. Need for vaccination    6. Anxiety, xanax for sleep mainly. melatonin with SE        Plan:       Well adult exam  Continue to work  on dietary improvements (decrease overall calorie intake, decrease sugar and carb intake, decrease animal protein intake)  Continue to exercise at least 30-40 minutes, 3 times per week  Immunizations were discussed and flu today  Preventative exams were discussed and up to date   Essential hypertension  Well controlled off medication  Moderate mixed hyperlipidemia not requiring statin therapy  Working on diet.  Recheck at this time  Age-related osteoporosis without current pathological fracture on DEXA 8/2018  Change Fosamax to Prolia today.  Need for vaccination  -     influenza (Flulaval, Fluzone, Fluarix) 45 mcg/0.5 mL IM vaccine (> or = 6 mo) 0.5 mL    Anxiety, xanax for sleep mainly. melatonin with SE  -     ALPRAZolam (XANAX) 0.25 MG tablet; Take 1 tablet (0.25 mg total) by mouth nightly as needed for Anxiety.  Dispense: 30 tablet; Refill: 0    Other orders  -     denosumab (PROLIA) injection 60 mg         Visit today included increased complexity associated with the care of the episodic problem HTN addressed and managing the longitudinal care of the patient due to the serious and/or complex managed problem(s) as above.       Medication List with Changes/Refills   Current Medications    ALENDRONATE (FOSAMAX) 70 MG TABLET    Take 1 tablet (70 mg total) by mouth every 7 days.    ESTRADIOL (ESTRACE) 0.01 % (0.1 MG/GRAM) VAGINAL CREAM    Place 1 gram vaginally nightly the first week then 1 gram twice a week.   Changed and/or Refilled Medications    Modified Medication Previous Medication    ALPRAZOLAM (XANAX) 0.25 MG TABLET ALPRAZolam (XANAX) 0.25 MG tablet       Take 1 tablet (0.25 mg total) by mouth nightly as needed for Anxiety.    Take 1 tablet (0.25 mg total) by mouth nightly as needed for Anxiety.

## 2024-10-18 ENCOUNTER — LAB VISIT (OUTPATIENT)
Dept: LAB | Facility: HOSPITAL | Age: 64
End: 2024-10-18
Attending: FAMILY MEDICINE
Payer: COMMERCIAL

## 2024-10-18 ENCOUNTER — OFFICE VISIT (OUTPATIENT)
Dept: OPHTHALMOLOGY | Facility: CLINIC | Age: 64
End: 2024-10-18
Payer: COMMERCIAL

## 2024-10-18 DIAGNOSIS — H25.13 NUCLEAR SCLEROSIS, BILATERAL: Primary | ICD-10-CM

## 2024-10-18 DIAGNOSIS — H52.7 REFRACTIVE ERROR: ICD-10-CM

## 2024-10-18 DIAGNOSIS — I10 ESSENTIAL HYPERTENSION: Chronic | ICD-10-CM

## 2024-10-18 DIAGNOSIS — H04.123 DRY EYES, BILATERAL: ICD-10-CM

## 2024-10-18 DIAGNOSIS — Z00.00 WELL ADULT EXAM: ICD-10-CM

## 2024-10-18 LAB
BILIRUB UR QL STRIP: NEGATIVE
CLARITY UR: CLEAR
COLOR UR: YELLOW
GLUCOSE UR QL STRIP: NEGATIVE
HGB UR QL STRIP: NEGATIVE
KETONES UR QL STRIP: NEGATIVE
LEUKOCYTE ESTERASE UR QL STRIP: NEGATIVE
NITRITE UR QL STRIP: NEGATIVE
PH UR STRIP: 6 [PH] (ref 5–8)
PROT UR QL STRIP: NEGATIVE
SP GR UR STRIP: 1.02 (ref 1–1.03)
URN SPEC COLLECT METH UR: NORMAL

## 2024-10-18 PROCEDURE — 81003 URINALYSIS AUTO W/O SCOPE: CPT | Mod: PO | Performed by: FAMILY MEDICINE

## 2024-10-18 PROCEDURE — 99999 PR PBB SHADOW E&M-EST. PATIENT-LVL II: CPT | Mod: PBBFAC,,, | Performed by: OPHTHALMOLOGY

## 2024-10-18 NOTE — PROGRESS NOTES
Subjective:       Patient ID: Livier Quintana is a 64 y.o. female.    Chief Complaint: Annual Exam    HPI    Last eye exam 2020    Eye meds: None    64 year old female states its been 4 years since her last eye exam. Denies   flashes, floaters and diplopia. Pt wants an updated pair of glasses. C/o   dry eyes. Denies ocular pain. Voices no concerns   Last edited by Shelbi Knight on 10/18/2024  1:46 PM.             Assessment:       1. Nuclear sclerosis, bilateral    2. Dry eyes, bilateral    3. Essential hypertension    4. Refractive error        Plan:       Cataracts- Not visually significant.  SKYE OU-Needs AT's.  HTN-No retinopathy OU.  RE-Pt wants MRx.      AT's.  Control HTN.  Give MRx.  RTC 1 yr in [place] with Dr Lizarraga.

## 2024-10-29 ENCOUNTER — HOSPITAL ENCOUNTER (OUTPATIENT)
Dept: RADIOLOGY | Facility: HOSPITAL | Age: 64
Discharge: HOME | End: 2024-10-29
Attending: FAMILY MEDICINE
Payer: COMMERCIAL

## 2024-10-29 DIAGNOSIS — Z12.31 ENCOUNTER FOR SCREENING MAMMOGRAM FOR MALIGNANT NEOPLASM OF BREAST: ICD-10-CM

## 2024-10-29 PROCEDURE — 77067 SCR MAMMO BI INCL CAD: CPT | Mod: TC,PO

## 2024-10-29 PROCEDURE — 77063 BREAST TOMOSYNTHESIS BI: CPT | Mod: TC,PO

## 2024-10-29 PROCEDURE — 77067 SCR MAMMO BI INCL CAD: CPT | Mod: 26,,, | Performed by: RADIOLOGY

## 2024-10-29 PROCEDURE — 77063 BREAST TOMOSYNTHESIS BI: CPT | Mod: 26,,, | Performed by: RADIOLOGY

## 2024-11-15 ENCOUNTER — HOSPITAL ENCOUNTER (OUTPATIENT)
Dept: RADIOLOGY | Facility: HOSPITAL | Age: 64
Discharge: HOME | End: 2024-11-15
Attending: STUDENT IN AN ORGANIZED HEALTH CARE EDUCATION/TRAINING PROGRAM
Payer: COMMERCIAL

## 2024-11-15 DIAGNOSIS — Z83.6 FAMILY HISTORY OF PULMONARY FIBROSIS: ICD-10-CM

## 2024-11-15 DIAGNOSIS — R06.02 SHORTNESS OF BREATH: ICD-10-CM

## 2024-11-15 DIAGNOSIS — R91.8 LUNG NODULES: ICD-10-CM

## 2024-11-15 PROCEDURE — 71250 CT THORAX DX C-: CPT | Mod: 26,,, | Performed by: RADIOLOGY

## 2024-11-15 PROCEDURE — 71250 CT THORAX DX C-: CPT | Mod: TC,PO

## 2024-11-19 ENCOUNTER — OFFICE VISIT (OUTPATIENT)
Dept: PULMONOLOGY | Facility: CLINIC | Age: 64
End: 2024-11-19
Payer: COMMERCIAL

## 2024-11-19 VITALS
HEIGHT: 61 IN | HEART RATE: 78 BPM | OXYGEN SATURATION: 94 % | BODY MASS INDEX: 26.06 KG/M2 | WEIGHT: 138 LBS | SYSTOLIC BLOOD PRESSURE: 124 MMHG | DIASTOLIC BLOOD PRESSURE: 78 MMHG

## 2024-11-19 DIAGNOSIS — Z83.6 FAMILY HISTORY OF INTERSTITIAL LUNG DISEASE: ICD-10-CM

## 2024-11-19 DIAGNOSIS — R06.02 SHORTNESS OF BREATH: Primary | ICD-10-CM

## 2024-11-19 PROCEDURE — 3008F BODY MASS INDEX DOCD: CPT | Mod: CPTII,S$GLB,, | Performed by: INTERNAL MEDICINE

## 2024-11-19 PROCEDURE — 99999 PR PBB SHADOW E&M-EST. PATIENT-LVL III: CPT | Mod: PBBFAC,,, | Performed by: INTERNAL MEDICINE

## 2024-11-19 PROCEDURE — 99214 OFFICE O/P EST MOD 30 MIN: CPT | Mod: S$GLB,,, | Performed by: INTERNAL MEDICINE

## 2024-11-19 PROCEDURE — 3078F DIAST BP <80 MM HG: CPT | Mod: CPTII,S$GLB,, | Performed by: INTERNAL MEDICINE

## 2024-11-19 PROCEDURE — 3044F HG A1C LEVEL LT 7.0%: CPT | Mod: CPTII,S$GLB,, | Performed by: INTERNAL MEDICINE

## 2024-11-19 PROCEDURE — 3074F SYST BP LT 130 MM HG: CPT | Mod: CPTII,S$GLB,, | Performed by: INTERNAL MEDICINE

## 2024-11-26 ENCOUNTER — HOSPITAL ENCOUNTER (OUTPATIENT)
Dept: PULMONOLOGY | Facility: CLINIC | Age: 64
Discharge: HOME | End: 2024-11-26
Payer: COMMERCIAL

## 2024-11-26 VITALS — WEIGHT: 138 LBS | HEIGHT: 61 IN | BODY MASS INDEX: 26.06 KG/M2

## 2024-11-26 DIAGNOSIS — R06.02 SHORTNESS OF BREATH: ICD-10-CM

## 2024-11-27 ENCOUNTER — OFFICE VISIT (OUTPATIENT)
Dept: FAMILY MEDICINE | Facility: CLINIC | Age: 64
End: 2024-11-27
Payer: COMMERCIAL

## 2024-11-27 VITALS
BODY MASS INDEX: 25.76 KG/M2 | SYSTOLIC BLOOD PRESSURE: 112 MMHG | DIASTOLIC BLOOD PRESSURE: 70 MMHG | HEART RATE: 82 BPM | OXYGEN SATURATION: 97 % | WEIGHT: 136.44 LBS | HEIGHT: 61 IN | TEMPERATURE: 98 F

## 2024-11-27 DIAGNOSIS — L21.9 SEBORRHEIC DERMATITIS: ICD-10-CM

## 2024-11-27 DIAGNOSIS — L73.9 FOLLICULITIS: Primary | ICD-10-CM

## 2024-11-27 PROBLEM — J44.9 MIXED RESTRICTIVE AND OBSTRUCTIVE LUNG DISEASE: Status: RESOLVED | Noted: 2018-08-20 | Resolved: 2024-11-27

## 2024-11-27 PROBLEM — J43.9 MIXED RESTRICTIVE AND OBSTRUCTIVE LUNG DISEASE: Status: RESOLVED | Noted: 2018-08-20 | Resolved: 2024-11-27

## 2024-11-27 PROBLEM — J98.4 MIXED RESTRICTIVE AND OBSTRUCTIVE LUNG DISEASE: Status: RESOLVED | Noted: 2018-08-20 | Resolved: 2024-11-27

## 2024-11-27 PROCEDURE — 99999 PR PBB SHADOW E&M-EST. PATIENT-LVL III: CPT | Mod: PBBFAC,,, | Performed by: NURSE PRACTITIONER

## 2024-11-27 PROCEDURE — 1159F MED LIST DOCD IN RCRD: CPT | Mod: CPTII,S$GLB,, | Performed by: NURSE PRACTITIONER

## 2024-11-27 PROCEDURE — 3044F HG A1C LEVEL LT 7.0%: CPT | Mod: CPTII,S$GLB,, | Performed by: NURSE PRACTITIONER

## 2024-11-27 PROCEDURE — 3074F SYST BP LT 130 MM HG: CPT | Mod: CPTII,S$GLB,, | Performed by: NURSE PRACTITIONER

## 2024-11-27 PROCEDURE — 3008F BODY MASS INDEX DOCD: CPT | Mod: CPTII,S$GLB,, | Performed by: NURSE PRACTITIONER

## 2024-11-27 PROCEDURE — 1160F RVW MEDS BY RX/DR IN RCRD: CPT | Mod: CPTII,S$GLB,, | Performed by: NURSE PRACTITIONER

## 2024-11-27 PROCEDURE — 99213 OFFICE O/P EST LOW 20 MIN: CPT | Mod: S$GLB,,, | Performed by: NURSE PRACTITIONER

## 2024-11-27 PROCEDURE — 3078F DIAST BP <80 MM HG: CPT | Mod: CPTII,S$GLB,, | Performed by: NURSE PRACTITIONER

## 2024-11-27 RX ORDER — FLUOCINONIDE TOPICAL SOLUTION USP, 0.05% 0.5 MG/ML
SOLUTION TOPICAL 2 TIMES DAILY PRN
Qty: 60 ML | Refills: 1 | Status: SHIPPED | OUTPATIENT
Start: 2024-11-27

## 2024-11-27 RX ORDER — DOXYCYCLINE 100 MG/1
100 CAPSULE ORAL 2 TIMES DAILY
Qty: 14 CAPSULE | Refills: 0 | Status: SHIPPED | OUTPATIENT
Start: 2024-11-27 | End: 2024-12-04

## 2024-11-27 RX ORDER — KETOCONAZOLE 20 MG/ML
SHAMPOO, SUSPENSION TOPICAL
Qty: 120 ML | Refills: 1 | Status: SHIPPED | OUTPATIENT
Start: 2024-11-28

## 2024-11-27 RX ORDER — FLUOCINONIDE TOPICAL SOLUTION USP, 0.05% 0.5 MG/ML
1 SOLUTION TOPICAL 2 TIMES DAILY PRN
COMMUNITY
End: 2024-11-27

## 2024-11-27 NOTE — PROGRESS NOTES
Patient ID: Livier Quintana is a 64 y.o. female.    Chief Complaint: Lump on upper thigh.  Last seen in primary care by PCP on 10/16/2024  First tie seeing me in the clinic  History of Present Illness    CHIEF COMPLAINT:  Patient presents today for evaluation of a lump in the groin area.    GROIN LUMP:  She reports a red, hard lump on the upper thigh near the lip, present for about two weeks. She denies any drainage from the area or history of similar lumps. She has not shaved in the affected area. Initially, she applied hydrogen peroxide as a home treatment, and more recently has been using antibiotic ointment as suggested by her sister.    PSORIASIS:  She has a history of psoriasis on her scalp, experiencing inflammation and a burning sensation. Currently, she uses Fluocinolone 0.05% solution once or twice daily as needed, and Ketoconazole 2% shampoo applied to her scalp twice weekly for five minutes. She reports these medications have been helpful. She previously saw a dermatologist after January 2018 for her scalp condition, but notes that this physician is now working in pathology and no longer sees patients.    MEDICAL HISTORY:  She denies any history of diabetes or recent antibiotic use.      ROS:  General: -fever, -chills, -fatigue, -weight gain, -weight loss  Eyes: -vision changes, -redness, -discharge  ENT: -ear pain, -nasal congestion, -sore throat  Cardiovascular: -chest pain, -palpitations, -lower extremity edema  Respiratory: -cough, -shortness of breath  Gastrointestinal: -abdominal pain, -nausea, -vomiting, -diarrhea, -constipation, -blood in stool  Genitourinary: -dysuria, -hematuria, -frequency  Musculoskeletal: -joint pain, -muscle pain  Skin: -rash, +lesion, -dry skin  Neurological: -headache, -dizziness, -numbness, -tingling  Psychiatric: -anxiety, -depression, -sleep difficulty         Physical Exam    General: No acute distress. Well-developed. Well-nourished.  Eyes: EOMI. Sclerae  anicteric.  HENT: Normocephalic. Atraumatic. Nares patent. Moist oral mucosa.  Ears: Bilateral TMs clear. Bilateral EACs clear.  Cardiovascular: Regular rate. Regular rhythm. No murmurs. No rubs. No gallops. Normal S1, S2.  Respiratory: Normal respiratory effort. Clear to auscultation bilaterally. No rales. No rhonchi. No wheezing.  Abdomen: Soft. Non-tender. Non-distended. Normoactive bowel sounds.  Musculoskeletal: No  obvious deformity.  Extremities: No lower extremity edema.  Neurological: Alert & oriented x3. No slurred speech. Normal gait.  Psychiatric: Normal mood. Normal affect. Good insight. Good judgment.  Skin: Warm. Dry. No rash. Red, hard lump area.         Assessment & Plan    Assessed lump in groin area as likely folliculitis  Recommend warm compresses and oral antibiotics due to firmness and redness of lump  Evaluated scalp condition, confirmed previous diagnosis of psoriasis  Determined current scalp treatments (Fluocinolone solution and ketoconazole shampoo) are appropriate to continue    FOLLICULITIS:  - Examined the lump between the patient's legs near the upper thigh close to the lip, present for about 2 weeks.  - The lump is red, firm, and about the size of a thumb tip.  - Diagnosed the condition as folliculitis, an infected hair follicle.  - Explained folliculitis to the patient as a skin condition where hair follicle becomes infected, causing a swollen bump.  - Discussed the difference between topical and oral antibiotics for treating folliculitis.  - Prescribed an oral antibiotic for folliculitis due to the location and nature of the lump.  - Instructed the patient to apply warm compresses to the lump in the groin area.  - Advised the patient to  the prescription from the pharmacy.  - Instructed the patient to contact the office if warm compresses do not improve folliculitis.  - Expressed concern about potential worsening over the holiday weekend.    PSORIASIS:  - Confirmed the  presence of psoriasis on the back of the patient's scalp through visual exam.  - Noted the patient's report of inflammation and burning sensation in the affected area.  - Reviewed the patient's previous prescriptions for psoriasis management.  - Continued Fluocinolone 0.05% external solution, to be applied 1-3 times daily as needed for scalp psoriasis.  - Continued ketoconazole 2% shampoo, to be applied to scalp twice weekly, left on for 5 minutes before rinsing.  - Instructed the primary care physician (Dr. Olsen) to continue prescribing psoriasis medications.    OTHER INSTRUCTIONS:  - Performed lung auscultation.  - Noted lungs are clear on auscultation.  - Performed a general physical exam, including auscultation of heart and lungs.  - Found no abnormalities during the general exam.  - Noted lungs are clear.    FOLLOW UP:  - Scheduled follow-up with Dr. Olsen (primary care) in October next year for routine visit.          Medication List with Changes/Refills   New Medications    DOXYCYCLINE (VIBRAMYCIN) 100 MG CAP    Take 1 capsule (100 mg total) by mouth 2 (two) times daily. for 7 days    FLUOCINONIDE (LIDEX) 0.05 % EXTERNAL SOLUTION    Apply topically 2 (two) times daily as needed (scalp irritation).    KETOCONAZOLE (NIZORAL) 2 % SHAMPOO    Apply topically twice a week. Let sit on scalp for 5 minutes prior to rinsing   Current Medications    ALENDRONATE (FOSAMAX) 70 MG TABLET    Take 1 tablet (70 mg total) by mouth every 7 days.    ALPRAZOLAM (XANAX) 0.25 MG TABLET    Take 1 tablet (0.25 mg total) by mouth nightly as needed for Anxiety.    ESTRADIOL (ESTRACE) 0.01 % (0.1 MG/GRAM) VAGINAL CREAM    Place 1 gram vaginally nightly the first week then 1 gram twice a week.   Discontinued Medications    FLUOCINONIDE (LIDEX) 0.05 % EXTERNAL SOLUTION    Apply 1 Application topically 2 (two) times daily as needed.        No follow-ups on file.    This note was generated with the assistance of ambient listening  technology. Verbal consent was obtained by the patient and accompanying visitor(s) for the recording of patient appointment to facilitate this note. I attest to having reviewed and edited the generated note for accuracy, though some syntax or spelling errors may persist. Please contact the author of this note for any clarification.

## 2024-11-29 ENCOUNTER — PATIENT MESSAGE (OUTPATIENT)
Facility: CLINIC | Age: 64
End: 2024-11-29
Payer: COMMERCIAL

## 2024-12-16 ENCOUNTER — PATIENT MESSAGE (OUTPATIENT)
Dept: ADMINISTRATIVE | Facility: HOSPITAL | Age: 64
End: 2024-12-16
Payer: COMMERCIAL

## 2024-12-18 ENCOUNTER — INFUSION (OUTPATIENT)
Dept: INFUSION THERAPY | Facility: HOSPITAL | Age: 64
End: 2024-12-18
Attending: FAMILY MEDICINE
Payer: COMMERCIAL

## 2024-12-18 VITALS
HEART RATE: 74 BPM | BODY MASS INDEX: 25.7 KG/M2 | WEIGHT: 136 LBS | TEMPERATURE: 98 F | RESPIRATION RATE: 18 BRPM | SYSTOLIC BLOOD PRESSURE: 114 MMHG | DIASTOLIC BLOOD PRESSURE: 73 MMHG

## 2024-12-18 DIAGNOSIS — M81.0 AGE-RELATED OSTEOPOROSIS WITHOUT CURRENT PATHOLOGICAL FRACTURE: Primary | ICD-10-CM

## 2024-12-18 PROCEDURE — 63600175 PHARM REV CODE 636 W HCPCS: Mod: JZ,JG,PN | Performed by: FAMILY MEDICINE

## 2024-12-18 RX ADMIN — DENOSUMAB 60 MG: 60 INJECTION SUBCUTANEOUS at 01:12

## 2024-12-20 PROBLEM — Z83.6 FAMILY HISTORY OF INTERSTITIAL LUNG DISEASE: Status: ACTIVE | Noted: 2024-12-20

## 2024-12-20 NOTE — PROGRESS NOTES
Subjective:      Patient ID: Livier Quintana is a 64 y.o. female.    Chief Complaint: No chief complaint on file.    HPI  The patient was seen/evaluated in person at this visit on 11/19/2024.    Ms. Quintana returns to [facility] as an Established Patient.  She was initially seen by me in November 2023 for evaluation of exertional dyspnea and her family history of pulmonary fibrosis.  She was previously evaluated by Dr. Esquivel ([facility]) in October 2018 for exertional dyspnea.  Work up at that time showed PFTs with obstruction and mild DLCO impairment.    Evaluation in Fall 2023 showed normal spirometry and lung volumes with minimal DLCO impairment.  As compared to past testing, lung mechanics were improved with resolution of prior obstruction and DLCO was stable.  Serologies were negative for any auto-immune disease typically associated with development of interstitial lung disease.  Six minute walk distance was normal with no exertional desaturation.  CT scan did not show evidence of interstitial lung disease but did show a few small nodules and mosaic pattern to suggest non-specific air trapping.  Based upon these findings, I recommended that she return in a year for repeat evaluation.    Since her last Pulmonary visit, she has been stable from the respiratory standpoint.  She continues to note some exertional dyspnea that seems pretty comparable to her past evaluation.  No cough, sputum production, or constitutional complaints noted.         Selected findings on Chest CT from 11/15/2024 =>   (compared to study from 19/26/2023)  There is no significant hilar or mediastinal adenopathy.  There is no pleural or pericardial effusion.  Visualized upper abdominal structures; cholelithiasis with small stone in the neck of the gallbladder.  Small fat containing posterior diaphragmatic hernia on the right  Lung; mosaic appearance on the expiratory images not seen on the prone inspiratory 4 supine  inspiratory.  Small pulmonary nodules not appearing significantly changed.  For example 4 mm nodule right middle lobe series 4, image 264 3 mm left upper lobe nodule series 4, image 189 3 mm left upper lobe nodule series 4, image 95.  A 2 mm nodule described right apex on the prior exam is less apparent today.  Small calcified granuloma also noted.  New 4 mm nodule right middle lobe series 4, image 278 questioning mucous plugging.     Impression:     - Multiple small nodules not significantly changed.  - New 4 mm nodule right lung suggesting mucous plugging.  For new 4-6 mm solid nodule suggest six-month follow-up CT.  - Air trapping with mosaic appearance of the lungs on expiratory images.        From November 2023 Pulmonary Notes =>    64 yo F with h/o osteoporosis, HTN, and intermittent SOB with exertion as well as significant family history of pulmonary fibrosis who is here for shortness of breath and CT scan abnormalities.      Per the patient, she remembers having breathing tests in 2018 but she doesn't remember feeling short of breath. She hasn't noticed anything since that time until about a year ago when she started noticing that she was short of breath with stairs. She walks a mile several times a week without shortness of breath but does consistently feel like she has to stop and catch her breath after a flight of stairs. She does intermittently feel like her heart is racing, mostly with exertion and she does have anxiety. She has a strong family history of pulmonary problems described as pulmonary fibrosis. Her father passed from it, she has several paternal uncles effected as well as a paternal aunt. Her younger brother is seen here and has been diagnosed with rheumatoid associated ILD and was started on steroids and ofev this year. 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 She doesn't have any pets. No weight gain or loss or rashes. Denies joint pain.        She reports numerous close relatives with pulmonary fibrosis, as well as connective tissue disease.  Her brother is being followed by Dr. Lee and is now on OFEV.  She describes shortness of breath with climbing stairs and longer walking.  She has only occasional cough that she attributes mostly to allergies.      Work up shows current PFTs with improved obstruction and normal lung volumes.  DLCO remains mildly impaired but unchanged since 2018.  Chest CT in October 2023 shows subtle mosaic attenuation bilaterally with several small nodules and prior granulomatous disease.  Although current findings do not show definitive evidence of interstitial lung disease, I think that she is at increased risk of autoimmune disease +/- ILD, as well as pulmonary hypertension.  We will check echocardiogram and 6MWT, as well as extensive serology to screen for connective tissue disease.  If testing is negative, we will plan to follow her over time with future chest CT (prone/supine images).    Review of Systems  Objective:     Physical Exam   Constitutional: She is oriented to person, place, and time. No distress.   Cardiovascular: Normal rate, regular rhythm and normal heart sounds. Exam reveals no gallop.   No murmur heard.  Pulmonary/Chest: Normal expansion, symmetric chest wall expansion, effort normal and breath sounds normal. No stridor. No respiratory distress. She has no decreased breath sounds. She has no wheezes. She has no rhonchi. She has no rales.   Musculoskeletal:         General: No edema.   Neurological: She is alert and oriented to person, place, and time. Gait normal.   Skin: No cyanosis. Nails show no clubbing.   Psychiatric: She has a normal mood and affect. Judgment normal.   Nursing note and vitals reviewed.    Pulse oximetry at rest:  Heart rate 71 and SpO2 96% while breathing room air.  Pulse oximetry with walking:  Heart rate 105 and SpO2 92% while breathing room air.      Personal Diagnostic  "Review    PFTs 11/28/23 7/6/2018   FVC  (pre-BD) 2.38 2.82   FVC%  85% 92%   FEV1 1.99 1.36   FEV1%  90% 56%   FEV1/FVC  84 48   FEF 25-75  2.44 1.01   FEF 25-75%  118% 44%   FVC (post-BD) 2.44     FVC% +2.7%     FEV1 2.09     FEV1% +5%     FEV1/FVC 86     FEF 25-75 2.57     TLC  4.09 4.54   TLC%  92% 99%   RV  1.71 1.54   RV%  96% 87%   DLCO  13.65 14.3   DLCO%  66% 65%   VA 3.55 3.70   IVC 2.36 2.20   Six-Minute Walk 11/29/2023      Distance (meter)  488      O2 vee  96 %     O2 change  -2 %         Echocardiogram on 11/29/2023 => Normal biventricular function with no pulmonary hypertension.  Left Ventricle: The left ventricle is normal in size. Ventricular mass is normal. Normal wall thickness. There is concentric remodeling. Normal wall motion. There is normal systolic function with a visually estimated ejection fraction of 55 - 60%. Ejection fraction by visual approximation is 55%. There is normal diastolic function.  Right Ventricle: Normal right ventricular cavity size. Wall thickness is normal. Right ventricle wall motion  is normal. Systolic function is normal.  Tricuspid Valve: There is mild to moderate regurgitation.  Pulmonary Artery: The estimated pulmonary artery systolic pressure is 26 mmHg.  IVC/SVC: Normal venous pressure at 3 mmHg.          12/18/2024     1:00 PM 11/27/2024     2:25 PM 11/26/2024    11:34 AM 11/19/2024     1:55 PM 10/16/2024     3:25 PM 9/9/2024     9:24 AM 11/29/2023    11:03 AM   Pulmonary Function Tests   Peak Flow  0 L/min        SpO2  97 %  94 % 95 %     Ordering Provider   MD Britney Tan MD   Performing nurse/tech/RT   Peter Bender RRT   Diagnosis   Shortness of Breath    Shortness of Breath   Height  5' 1" (1.549 m) 5' 1" (1.549 m) 5' 1" (1.549 m) 5' 1" (1.549 m)  5' 1" (1.549 m)   Weight 61.7 kg (136 lb 0.4 oz) 61.9 kg (136 lb 7.4 oz) 62.6 kg (138 lb 0.1 oz) 62.6 kg (138 lb 0.1 oz) 62.6 kg (138 lb 0.1 oz) 62.9 kg (138 lb 10.7 oz) 59.9 kg (132 lb)   BMI " (Calculated) 25.7 25.8 26.1 26.1 26.1  25   Patient Race          6MWT Status   completed without stopping    completed with stops   Patient Reported   No complaints    No complaints   Was O2 used?   No    No   6MW Distance walked (feet)   1500 feet    1600 feet   Distance walked (meters)   457.2 meters    487.68 meters   Did patient stop?   No    No   Type of assistive device(s) used?   no assistive devices    no assistive devices   Oxygen Saturation   97 %    98 %   Supplemental Oxygen   Room Air    Room Air   Heart Rate   75 bpm    63 bpm   Blood Pressure   131/78    117/73   Jose Rafael Dyspnea Rating    nothing at all    nothing at all   Oxygen Saturation   97 %    96 %   Supplemental Oxygen   Room Air    Room Air   Heart Rate   77 bpm    94 bpm   Blood Pressure   124/82    132/72   Jose Rafael Dyspnea Rating    very, very light (just noticeable)    very, very light (just noticeable)   Recovery Time (seconds)   70 seconds    60 seconds   Oxygen Saturation   98 %    98 %   Supplemental Oxygen   Room Air    Room Air   Heart Rate   70 bpm    73 bpm   Is procedure ready for interpretation?   Yes    Yes   Oxygen Qualification?       No        Assessment:     1. Shortness of breath    2. Family history of interstitial lung disease         Outpatient Encounter Medications as of 11/19/2024   Medication Sig Dispense Refill    alendronate (FOSAMAX) 70 MG tablet Take 1 tablet (70 mg total) by mouth every 7 days. (Patient not taking: Reported on 11/27/2024) 12 tablet 3    estradioL (ESTRACE) 0.01 % (0.1 mg/gram) vaginal cream Place 1 gram vaginally nightly the first week then 1 gram twice a week. 42.5 g 1    ALPRAZolam (XANAX) 0.25 MG tablet Take 1 tablet (0.25 mg total) by mouth nightly as needed for Anxiety. 30 tablet 0     No facility-administered encounter medications on file as of 11/19/2024.     Orders Placed This Encounter   Procedures    Stress test, pulmonary     Standing Status:   Future     Number of Occurrences:   1  "    Standing Expiration Date:   11/19/2025     Order Specific Question:   Reason for study     Answer:   Functional status     Order Specific Question:   Release to patient     Answer:   Immediate       Plan:     There is no evidence to suggest interval development of interstitial lung disease or other specific pulmonary diagnosis to explain her chronic dyspnea.  Interval CT scan is not suspicious for evolving lung disease.  The reported "nodule" in the RML is not concerning for malignancy.  We will plan on follow up chest CT (non-contrast in a year) -- if that study is unchanged, I doubt that serial imaging will continue to be necessary.  We will also check 6MWT to make sure that her exercise capacity and exertional SpO2 have remained stable.    Problem List Items Addressed This Visit       Family history of interstitial lung disease    Overview     - Brother with ILD  - Work up negative for serologic evidence of autoimmune disease  - CT scans negative for ILD in 2023 and 2024         Current Assessment & Plan     Continue to monitor with annual Pulmonary evaluation.         Shortness of breath - Primary    Overview     - Initially reported in 2018 but subjectively worse over the course of 2023 -- worse with exertion and walking up stairs.   - PFTs in November 2023 with normal spirometry and lung volumes and DLCO that is stably decreased (c/w - prior DLCO done 2018).   - Subtle mosaicism noted in CT chest from 10/2023 with extensive family history of pulmonary fibrosis.   - Follow up chest CT in 11/2024 without significant interval change since 2023.  - Normal 6MWT distance and SpO2 in 11/2023 and 11/2024.  - Serologic work up negative for autoimmune disease in 11/2023.         Current Assessment & Plan     No suggestion of decline in lung function by PFTs or 6MWT to suggest interval development of lung disease (such as Interstitial Lung Disease).  Stable CT scan -- non-specific mosaicism with normal echocardiogram " and no air trapping/obstruction on PFTs.    Will continue to monitor for progression of dyspnea to suggest interval development of lung disease.  Repeat chest CT in 1 year (late 2025)  Follow up 6MWT to assess functional status in 1 year.         Relevant Orders    Stress test, pulmonary (Completed)       Note copied to Dr. Chavarria (Primary Care)    Total Time = 30  minutes    Sathya Tan MD  Pulmonary Disease  [facility] 9th Fl        Post-Clinic Test Results:    11/26/2024---------Distance: 457.2 meters (1500 feet)  Predicted = 354 meters       O2 Sat % Supplemental Oxygen Heart Rate Blood Pressure Jose Rafael Scale   Pre-exercise  (Resting) 97 % Room Air 75 bpm 131/78 mmHg 0   During Exercise 97 % Room Air 77 bpm 124/82 mmHg 0.5   Post-exercise  (Recovery) 98 % Room Air  70 bpm       Sathya Tan MD  Pulmonary Disease  [facility] 9th Fl

## 2024-12-20 NOTE — ASSESSMENT & PLAN NOTE
No suggestion of decline in lung function by PFTs or 6MWT to suggest interval development of lung disease (such as Interstitial Lung Disease).  Stable CT scan -- non-specific mosaicism with normal echocardiogram and no air trapping/obstruction on PFTs.    Will continue to monitor for progression of dyspnea to suggest interval development of lung disease.  Repeat chest CT in 1 year (late 2025)  Follow up 6MWT to assess functional status in 1 year.

## 2024-12-23 ENCOUNTER — PATIENT OUTREACH (OUTPATIENT)
Dept: ADMINISTRATIVE | Facility: HOSPITAL | Age: 64
End: 2024-12-23
Payer: COMMERCIAL

## 2024-12-23 DIAGNOSIS — Z12.11 ENCOUNTER FOR COLORECTAL CANCER SCREENING: Primary | ICD-10-CM

## 2024-12-23 DIAGNOSIS — Z12.12 ENCOUNTER FOR COLORECTAL CANCER SCREENING: Primary | ICD-10-CM

## 2024-12-23 NOTE — PROGRESS NOTES
Please drink plenty of fluids.  Please get plenty of rest.  Please return here or go to the Emergency Department for any concerns or worsening of condition.  If you were prescribed antibiotics, please take them to completion.  If you were given wait & see antibiotics, please wait 5-7 days before taking them, and only take them if your symptoms have worsened or not improved.  If you do begin taking the antibiotics, please take them to completion.  If you were prescribed a narcotic medication, do not drive or operate heavy equipment or machinery while taking these medications.  If you were given a steroid shot in the clinic and have also been given a prescription for a steroid such as Prednisone or a Medrol Dose Pack, please begin taking them tomorrow.  If you do not have Hypertension or any history of palpitations, it is ok to take over the counter Sudafed or Mucinex D or Allegra-D or Claritin-D or Zyrtec-D.  If you do take one of the above, it is ok to combine that with plain over the counter Mucinex or Allegra or Claritin or Zyrtec.  If for example you are taking Zyrtec -D, you can combine that with Mucinex, but not Mucinex-D.  If you are taking Mucinex-D, you can combine that with plain Allegra or Claritin or Zyrtec.   If you do have Hypertension or palpitations, it is safe to take Coricidin HBP for relief of sinus symptoms.  If not allergic, please take over the counter Tylenol (Acetaminophen) and/or Motrin (Ibuprofen) as directed for control of pain and/or fever.  Please follow up with your primary care doctor or specialist as needed.    If you  smoke, please stop smoking.     Population Health Chart Review & Patient Outreach Details      Additional [facility] Health Notes:               Updates Requested / Reviewed:      Updated Care Coordination Note         Health Maintenance Topics Overdue:      VB Score: 0     Patient is not due for any topics at this time.    Pneumonia Vaccine  Shingles/Zoster Vaccine  RSV Vaccine                  Health Maintenance Topic(s) Outreach Outcomes & Actions Taken:    Colorectal Cancer Screening - Outreach Outcomes & Actions Taken  : Colonoscopy Case Request / Referral / Home Test Order Placed

## 2025-01-07 ENCOUNTER — TELEPHONE (OUTPATIENT)
Dept: GASTROENTEROLOGY | Facility: CLINIC | Age: 65
End: 2025-01-07
Payer: COMMERCIAL

## 2025-01-09 ENCOUNTER — OFFICE VISIT (OUTPATIENT)
Dept: FAMILY MEDICINE | Facility: CLINIC | Age: 65
End: 2025-01-09
Payer: COMMERCIAL

## 2025-01-09 VITALS
HEIGHT: 61 IN | OXYGEN SATURATION: 96 % | WEIGHT: 139.75 LBS | SYSTOLIC BLOOD PRESSURE: 122 MMHG | DIASTOLIC BLOOD PRESSURE: 82 MMHG | HEART RATE: 83 BPM | BODY MASS INDEX: 26.39 KG/M2

## 2025-01-09 DIAGNOSIS — R05.9 COUGH, UNSPECIFIED TYPE: Primary | ICD-10-CM

## 2025-01-09 DIAGNOSIS — B00.1 COLD SORE: ICD-10-CM

## 2025-01-09 PROCEDURE — 3074F SYST BP LT 130 MM HG: CPT | Mod: CPTII,S$GLB,, | Performed by: INTERNAL MEDICINE

## 2025-01-09 PROCEDURE — 3079F DIAST BP 80-89 MM HG: CPT | Mod: CPTII,S$GLB,, | Performed by: INTERNAL MEDICINE

## 2025-01-09 PROCEDURE — 99999 PR PBB SHADOW E&M-EST. PATIENT-LVL III: CPT | Mod: PBBFAC,,, | Performed by: INTERNAL MEDICINE

## 2025-01-09 PROCEDURE — 3008F BODY MASS INDEX DOCD: CPT | Mod: CPTII,S$GLB,, | Performed by: INTERNAL MEDICINE

## 2025-01-09 PROCEDURE — 1160F RVW MEDS BY RX/DR IN RCRD: CPT | Mod: CPTII,S$GLB,, | Performed by: INTERNAL MEDICINE

## 2025-01-09 PROCEDURE — 99213 OFFICE O/P EST LOW 20 MIN: CPT | Mod: S$GLB,,, | Performed by: INTERNAL MEDICINE

## 2025-01-09 PROCEDURE — 1159F MED LIST DOCD IN RCRD: CPT | Mod: CPTII,S$GLB,, | Performed by: INTERNAL MEDICINE

## 2025-01-09 RX ORDER — BENZONATATE 200 MG/1
200 CAPSULE ORAL 3 TIMES DAILY PRN
Qty: 30 CAPSULE | Refills: 0 | Status: SHIPPED | OUTPATIENT
Start: 2025-01-09 | End: 2025-01-19

## 2025-01-09 RX ORDER — ACYCLOVIR 50 MG/G
OINTMENT TOPICAL
Qty: 30 G | Refills: 0 | Status: SHIPPED | OUTPATIENT
Start: 2025-01-09

## 2025-01-09 RX ORDER — PROMETHAZINE HYDROCHLORIDE AND DEXTROMETHORPHAN HYDROBROMIDE 6.25; 15 MG/5ML; MG/5ML
5 SYRUP ORAL EVERY 4 HOURS PRN
Qty: 118 ML | Refills: 0 | Status: SHIPPED | OUTPATIENT
Start: 2025-01-09 | End: 2025-01-19

## 2025-01-09 NOTE — PROGRESS NOTES
Patient ID: Livier Quintana is a 64 y.o. female.    Chief Complaint: Chest Congestion     Assessment and Plan     1. Cough, unspecified type  - promethazine-dextromethorphan (PROMETHAZINE-DM) 6.25-15 mg/5 mL Syrp; Take 5 mLs by mouth every 4 (four) hours as needed.  Dispense: 118 mL; Refill: 0  - benzonatate (TESSALON) 200 MG capsule; Take 1 capsule (200 mg total) by mouth 3 (three) times daily as needed for Cough.  Dispense: 30 capsule; Refill: 0    2. Cold sore  - acyclovir 5% (ZOVIRAX) 5 % ointment; Apply topically 6 (six) times daily.  Dispense: 30 g; Refill: 0       Assessment & Plan    PLAN SUMMARY:  - Started promethazine DM cough syrup for nighttime use  - Initiated Benzonatate (Tessalon Pearls) for daytime cough suppression  - Prescribed acyclovir ointment for cold sore, to be applied 6 times daily  - Advised patient to elevate head while sleeping  - Continue Flonase for nighttime dryness  - Contact office if symptoms worsen or cough becomes more mucousy with fever        No follow-ups on file.   HPI     History of Present Illness    CHIEF COMPLAINT:  Livier presents with persistent cough and concerns about lung health, particularly regarding potential pulmonary fibrosis due to family history.    HPI:  Livier reports a cough lasting 4 days, which worsens at night and is associated with chest discomfort attributed to possible rib pain from excessive coughing. The cough is mostly non-productive with clear expectorant. Livier describes hearing abnormal sounds in her lungs when coughing, which is her primary concern. She also has a sore throat, believed to be due to frequent coughing rather than an infection.    Livier has headaches when coughing that subside once the coughing stops. She has found some relief by elevating her head while sleeping. She has been using OTC Corseed for symptom management.    Livier has a fever blister (cold sore) on her lower lip towards the left side, which she associates with  stress. These occur approximately every 6 months.    Livier is planning to go on a cruise on the  and is concerned about recovering before the trip.    Livier denies fever, colored sputum, wheezing, or feeling the need for an albuterol inhaler. She has not been diagnosed with pulmonary fibrosis.    FAMILY HISTORY:  Family history is significant for father who  of pulmonary fibrosis. Her brother has pulmonary fibrosis and is currently on the lung transplant list. Livier's aunt (father's sister)  at age 64, possibly from pulmonary fibrosis.      ROS:  General: -fever  ENT: +sore throat  Cardiovascular: +chest pain  Respiratory: +cough, -wheezing  Neurological: +headache         Review of Systems   Constitutional:  Negative for fever.   Respiratory:  Negative for shortness of breath.    Cardiovascular:  Negative for chest pain.   Gastrointestinal:  Negative for abdominal pain.       I personally reviewed past medical, family and social history.     Objective    Vitals:    25 1336   BP: 122/82   Pulse: 83      Wt Readings from Last 3 Encounters:   25 1336 63.4 kg (139 lb 12.4 oz)   24 1300 61.7 kg (136 lb 0.4 oz)   24 1425 61.9 kg (136 lb 7.4 oz)      Body mass index is 26.41 kg/m².     Physical Exam    HENT: Pharyngeal erythema.  Ears: Bilateral TMs clear.  Cardiovascular: Regular rhythm. No murmurs. No gallops.  Respiratory: Clear to auscultation bilaterally.  Neck: All normal.        Reference     : NA   Active Problem List with Overview Notes    Diagnosis Date Noted    Family history of interstitial lung disease 2024     - Brother with ILD  - Work up negative for serologic evidence of autoimmune disease  - CT scans negative for ILD in  and       Mixed hyperlipidemia 10/16/2024    Shortness of breath 2023     - Initially reported in  but subjectively worse over the course of  -- worse with exertion and walking up stairs.   - PFTs in 2023 with  normal spirometry and lung volumes and DLCO that is stably decreased (c/w - prior DLCO done 2018).   - Subtle mosaicism noted in CT chest from 10/2023 with extensive family history of pulmonary fibrosis.   - Follow up chest CT in 11/2024 without significant interval change since 2023.  - Normal 6MWT distance and SpO2 in 11/2023 and 11/2024.  - Serologic work up negative for autoimmune disease in 11/2023.      Lung nodules 11/28/2023     CT Chest from Oct 2023, 4 small nodules noted.       Age-related osteoporosis without current pathological fracture on DEXA 8/2018 08/16/2018    Vitamin D deficiency 07/20/2018    Early menopause 07/20/2018    Anxiety, xanax for sleep mainly. melatonin with SE 04/13/2017    Family history of breast cancer in sister 02/23/2016    Essential hypertension 10/05/2015    Dry eyes, bilateral 10/05/2015    Screening for colon cancer 2015 normal repeat 10 years 03/09/2015     3/9/2015 colonoscopy normal repeat 10 years      Refractive error 09/12/2014    Nuclear sclerosis, bilateral 07/19/2012                 Medication List with Changes/Refills   New Medications    ACYCLOVIR 5% (ZOVIRAX) 5 % OINTMENT    Apply topically 6 (six) times daily.    BENZONATATE (TESSALON) 200 MG CAPSULE    Take 1 capsule (200 mg total) by mouth 3 (three) times daily as needed for Cough.    PROMETHAZINE-DEXTROMETHORPHAN (PROMETHAZINE-DM) 6.25-15 MG/5 ML SYRP    Take 5 mLs by mouth every 4 (four) hours as needed.   Current Medications    ALENDRONATE (FOSAMAX) 70 MG TABLET    Take 1 tablet (70 mg total) by mouth every 7 days.    ALPRAZOLAM (XANAX) 0.25 MG TABLET    Take 1 tablet (0.25 mg total) by mouth nightly as needed for Anxiety.    ESTRADIOL (ESTRACE) 0.01 % (0.1 MG/GRAM) VAGINAL CREAM    Place 1 gram vaginally nightly the first week then 1 gram twice a week.    FLUOCINONIDE (LIDEX) 0.05 % EXTERNAL SOLUTION    Apply topically 2 (two) times daily as needed (scalp irritation).    KETOCONAZOLE (NIZORAL) 2 % SHAMPOO     Apply topically twice a week. Let sit on scalp for 5 minutes prior to rinsing         This note was generated with the assistance of ambient listening technology. Verbal consent was obtained by the patient and accompanying visitor(s) for the recording of patient appointment to facilitate this note. I attest to having reviewed and edited the generated note for accuracy, though some syntax or spelling errors may persist. Please contact the author of this note for any clarification.

## 2025-01-10 ENCOUNTER — PATIENT MESSAGE (OUTPATIENT)
Dept: FAMILY MEDICINE | Facility: CLINIC | Age: 65
End: 2025-01-10
Payer: COMMERCIAL

## 2025-03-10 ENCOUNTER — OFFICE VISIT (OUTPATIENT)
Dept: OBSTETRICS AND GYNECOLOGY | Facility: CLINIC | Age: 65
End: 2025-03-10
Payer: COMMERCIAL

## 2025-03-10 VITALS
SYSTOLIC BLOOD PRESSURE: 128 MMHG | BODY MASS INDEX: 25.73 KG/M2 | DIASTOLIC BLOOD PRESSURE: 80 MMHG | WEIGHT: 136.19 LBS

## 2025-03-10 DIAGNOSIS — R87.610 ATYPICAL SQUAMOUS CELL CHANGES OF UNDETERMINED SIGNIFICANCE (ASCUS) ON CERVICAL CYTOLOGY WITH NEGATIVE HIGH RISK HUMAN PAPILLOMA VIRUS (HPV) TEST RESULT: Primary | ICD-10-CM

## 2025-03-10 PROCEDURE — 1159F MED LIST DOCD IN RCRD: CPT | Mod: CPTII,S$GLB,, | Performed by: OBSTETRICS & GYNECOLOGY

## 2025-03-10 PROCEDURE — 99999 PR PBB SHADOW E&M-EST. PATIENT-LVL III: CPT | Mod: PBBFAC,,, | Performed by: OBSTETRICS & GYNECOLOGY

## 2025-03-10 PROCEDURE — 1160F RVW MEDS BY RX/DR IN RCRD: CPT | Mod: CPTII,S$GLB,, | Performed by: OBSTETRICS & GYNECOLOGY

## 2025-03-10 PROCEDURE — 3008F BODY MASS INDEX DOCD: CPT | Mod: CPTII,S$GLB,, | Performed by: OBSTETRICS & GYNECOLOGY

## 2025-03-10 PROCEDURE — 3074F SYST BP LT 130 MM HG: CPT | Mod: CPTII,S$GLB,, | Performed by: OBSTETRICS & GYNECOLOGY

## 2025-03-10 PROCEDURE — 3079F DIAST BP 80-89 MM HG: CPT | Mod: CPTII,S$GLB,, | Performed by: OBSTETRICS & GYNECOLOGY

## 2025-03-10 PROCEDURE — 88175 CYTOPATH C/V AUTO FLUID REDO: CPT | Performed by: PATHOLOGY

## 2025-03-10 PROCEDURE — 99214 OFFICE O/P EST MOD 30 MIN: CPT | Mod: S$GLB,,, | Performed by: OBSTETRICS & GYNECOLOGY

## 2025-03-10 PROCEDURE — 88141 CYTOPATH C/V INTERPRET: CPT | Mod: ,,, | Performed by: PATHOLOGY

## 2025-03-10 NOTE — PROGRESS NOTES
Chief Complaint   Patient presents with    Abnormal Pap Smear     Ascus pap in Sept        History of Present Illness   65 y.o.    patient presents today for follow up ASCUS pap, negative HPV.  Vaginal dryness.  Did not use estrace cream    Past medical and surgical history reviewed.   I have reviewed the patient's medical history in detail and updated the computerized patient record.    Review of patient's allergies indicates:  No Known Allergies      Review of Systems -   GEN ROS: negative  Breast ROS: negative for breast lumps  Genito-Urinary ROS: vaginal dryness. No disch.      Physical Examination:  /80 (BP Location: Right arm, Patient Position: Sitting)   Wt 61.8 kg (136 lb 3.2 oz)   LMP 2012   BMI 25.73 kg/m²      OBGyn Exam   Abd: non tender, no rebound, no guarding, no organomegaly  V,v: no lesions  Cervix: no lesions, pap  Uterus: deferred  Adnexa: no masses, non tender  Assessment:  Vaginal dryness  1. Atypical squamous cell changes of undetermined significance (ASCUS) on cervical cytology with negative high risk human papilloma virus (HPV) test result  Liquid-Based Pap Smear, Diagnostic          Plan:  Estrace cream twice a week  Pap  Annual 6 mos  Patient informed will be contacted with results within 2 weeks. Encouraged to please call back or email if she has not heard from us by then.

## 2025-03-13 ENCOUNTER — RESULTS FOLLOW-UP (OUTPATIENT)
Dept: OBSTETRICS AND GYNECOLOGY | Facility: CLINIC | Age: 65
End: 2025-03-13

## 2025-03-13 LAB
FINAL PATHOLOGIC DIAGNOSIS: NORMAL
Lab: NORMAL

## 2025-03-24 ENCOUNTER — TELEPHONE (OUTPATIENT)
Dept: GASTROENTEROLOGY | Facility: CLINIC | Age: 65
End: 2025-03-24
Payer: COMMERCIAL

## 2025-03-24 NOTE — TELEPHONE ENCOUNTER
----- Message from Nany sent at 3/24/2025 10:21 AM CDT -----  Regarding: Reschedule Appt  Type: Reschedule Appointment RequestName of Caller: pt  Currently 4/25Would the patient rather a call back or a response via [facility]? Infer Call Back Number: [phone] Information: pt is calling to reschedule her colonoscopy for 4/25 out until around 09/19  Please call back to advise. Thanks!

## 2025-03-24 NOTE — TELEPHONE ENCOUNTER
Spoke with pt. Informed that at this time, we are not scheduling for September. Pt advised to call back in July. Procedure canceled. Pt verbalized understanding to all.

## 2025-06-02 ENCOUNTER — PATIENT MESSAGE (OUTPATIENT)
Dept: INFUSION THERAPY | Facility: HOSPITAL | Age: 65
End: 2025-06-02
Payer: COMMERCIAL

## 2025-06-02 ENCOUNTER — TELEPHONE (OUTPATIENT)
Dept: INFUSION THERAPY | Facility: HOSPITAL | Age: 65
End: 2025-06-02
Payer: COMMERCIAL

## 2025-06-11 ENCOUNTER — TELEPHONE (OUTPATIENT)
Dept: PULMONOLOGY | Facility: CLINIC | Age: 65
End: 2025-06-11
Payer: COMMERCIAL

## 2025-06-11 NOTE — TELEPHONE ENCOUNTER
Patient not due for follow up until November, 2025 for annual visit. Schedules are not available yet.   Follow up in about 1 year (around 11/19/2025) for Review of follow up chest CT scan per Dr Tan note from last visit on 11/19/24.

## 2025-06-15 ENCOUNTER — TELEPHONE (OUTPATIENT)
Dept: FAMILY MEDICINE | Facility: CLINIC | Age: 65
End: 2025-06-15
Payer: COMMERCIAL

## 2025-06-15 DIAGNOSIS — M81.0 AGE-RELATED OSTEOPOROSIS WITHOUT CURRENT PATHOLOGICAL FRACTURE: Primary | ICD-10-CM

## 2025-06-16 ENCOUNTER — INFUSION (OUTPATIENT)
Dept: INFUSION THERAPY | Facility: HOSPITAL | Age: 65
End: 2025-06-16
Attending: FAMILY MEDICINE
Payer: COMMERCIAL

## 2025-06-16 ENCOUNTER — LAB VISIT (OUTPATIENT)
Dept: LAB | Facility: HOSPITAL | Age: 65
End: 2025-06-16
Attending: FAMILY MEDICINE
Payer: COMMERCIAL

## 2025-06-16 VITALS
SYSTOLIC BLOOD PRESSURE: 127 MMHG | TEMPERATURE: 64 F | HEART RATE: 73 BPM | OXYGEN SATURATION: 98 % | DIASTOLIC BLOOD PRESSURE: 65 MMHG

## 2025-06-16 DIAGNOSIS — M81.0 AGE-RELATED OSTEOPOROSIS WITHOUT CURRENT PATHOLOGICAL FRACTURE: ICD-10-CM

## 2025-06-16 DIAGNOSIS — M81.0 AGE-RELATED OSTEOPOROSIS WITHOUT CURRENT PATHOLOGICAL FRACTURE: Primary | ICD-10-CM

## 2025-06-16 LAB
ALBUMIN SERPL BCP-MCNC: 4.2 G/DL (ref 3.5–5.2)
ALP SERPL-CCNC: 70 UNIT/L (ref 40–150)
ALT SERPL W/O P-5'-P-CCNC: 14 UNIT/L (ref 10–44)
ANION GAP (OHS): 12 MMOL/L (ref 8–16)
AST SERPL-CCNC: 21 UNIT/L (ref 11–45)
BILIRUB SERPL-MCNC: 0.6 MG/DL (ref 0.1–1)
BUN SERPL-MCNC: 15 MG/DL (ref 8–23)
CALCIUM SERPL-MCNC: 9.2 MG/DL (ref 8.7–10.5)
CHLORIDE SERPL-SCNC: 105 MMOL/L (ref 95–110)
CO2 SERPL-SCNC: 24 MMOL/L (ref 23–29)
CREAT SERPL-MCNC: 0.8 MG/DL (ref 0.5–1.4)
GFR SERPLBLD CREATININE-BSD FMLA CKD-EPI: >60 ML/MIN/1.73/M2
GLUCOSE SERPL-MCNC: 87 MG/DL (ref 70–110)
POTASSIUM SERPL-SCNC: 4 MMOL/L (ref 3.5–5.1)
PROT SERPL-MCNC: 8 GM/DL (ref 6–8.4)
SODIUM SERPL-SCNC: 141 MMOL/L (ref 136–145)

## 2025-06-16 PROCEDURE — 82040 ASSAY OF SERUM ALBUMIN: CPT | Mod: PN

## 2025-06-16 PROCEDURE — 96372 THER/PROPH/DIAG INJ SC/IM: CPT | Mod: PN

## 2025-06-16 PROCEDURE — 63600175 PHARM REV CODE 636 W HCPCS: Mod: JZ,TB,PN | Performed by: FAMILY MEDICINE

## 2025-06-16 PROCEDURE — 36415 COLL VENOUS BLD VENIPUNCTURE: CPT | Mod: PN

## 2025-06-16 RX ADMIN — DENOSUMAB 60 MG: 60 INJECTION SUBCUTANEOUS at 01:06

## 2025-06-16 NOTE — PLAN OF CARE
Problem: Adult Inpatient Plan of Care  Goal: Plan of Care Review  Outcome: Met     Problem: Electrolyte Imbalance  Goal: Electrolyte Balance  Outcome: Progressing   Tolerated injection without diffculty D/C instructions given and pt ambulated to private vehicle per self without difficulty  NAD

## 2025-06-17 ENCOUNTER — RESULTS FOLLOW-UP (OUTPATIENT)
Dept: FAMILY MEDICINE | Facility: CLINIC | Age: 65
End: 2025-06-17

## 2025-08-13 ENCOUNTER — TELEPHONE (OUTPATIENT)
Dept: INFUSION THERAPY | Facility: HOSPITAL | Age: 65
End: 2025-08-13
Payer: COMMERCIAL

## 2025-08-13 DIAGNOSIS — E78.2 MIXED HYPERLIPIDEMIA: ICD-10-CM

## 2025-08-13 DIAGNOSIS — Z00.00 WELL ADULT EXAM: Primary | ICD-10-CM

## 2025-08-13 DIAGNOSIS — Z12.31 ENCOUNTER FOR SCREENING MAMMOGRAM FOR BREAST CANCER: ICD-10-CM

## 2025-08-13 DIAGNOSIS — I10 ESSENTIAL HYPERTENSION: Chronic | ICD-10-CM
